# Patient Record
Sex: FEMALE | Race: WHITE | NOT HISPANIC OR LATINO | Employment: OTHER | URBAN - METROPOLITAN AREA
[De-identification: names, ages, dates, MRNs, and addresses within clinical notes are randomized per-mention and may not be internally consistent; named-entity substitution may affect disease eponyms.]

---

## 2023-06-14 ENCOUNTER — TELEPHONE (OUTPATIENT)
Dept: PRIMARY CARE | Facility: CLINIC | Age: 68
End: 2023-06-14
Payer: MEDICARE

## 2023-07-11 LAB
ALANINE AMINOTRANSFERASE (SGPT) (U/L) IN SER/PLAS: 24 U/L (ref 7–45)
ALBUMIN (G/DL) IN SER/PLAS: 4.4 G/DL (ref 3.4–5)
ALKALINE PHOSPHATASE (U/L) IN SER/PLAS: 46 U/L (ref 33–136)
ANION GAP IN SER/PLAS: 11 MMOL/L (ref 10–20)
ASPARTATE AMINOTRANSFERASE (SGOT) (U/L) IN SER/PLAS: 21 U/L (ref 9–39)
BASOPHILS (10*3/UL) IN BLOOD BY AUTOMATED COUNT: 0.04 X10E9/L (ref 0–0.1)
BASOPHILS/100 LEUKOCYTES IN BLOOD BY AUTOMATED COUNT: 0.8 % (ref 0–2)
BILIRUBIN TOTAL (MG/DL) IN SER/PLAS: 0.5 MG/DL (ref 0–1.2)
CALCIDIOL (25 OH VITAMIN D3) (NG/ML) IN SER/PLAS: 61 NG/ML
CALCIUM (MG/DL) IN SER/PLAS: 9 MG/DL (ref 8.6–10.3)
CARBON DIOXIDE, TOTAL (MMOL/L) IN SER/PLAS: 28 MMOL/L (ref 21–32)
CHLORIDE (MMOL/L) IN SER/PLAS: 104 MMOL/L (ref 98–107)
COBALAMIN (VITAMIN B12) (PG/ML) IN SER/PLAS: 961 PG/ML (ref 211–911)
CREATININE (MG/DL) IN SER/PLAS: 0.84 MG/DL (ref 0.5–1.05)
EOSINOPHILS (10*3/UL) IN BLOOD BY AUTOMATED COUNT: 0.1 X10E9/L (ref 0–0.7)
EOSINOPHILS/100 LEUKOCYTES IN BLOOD BY AUTOMATED COUNT: 2.1 % (ref 0–6)
ERYTHROCYTE DISTRIBUTION WIDTH (RATIO) BY AUTOMATED COUNT: 13.2 % (ref 11.5–14.5)
ERYTHROCYTE MEAN CORPUSCULAR HEMOGLOBIN CONCENTRATION (G/DL) BY AUTOMATED: 32.7 G/DL (ref 32–36)
ERYTHROCYTE MEAN CORPUSCULAR VOLUME (FL) BY AUTOMATED COUNT: 90 FL (ref 80–100)
ERYTHROCYTES (10*6/UL) IN BLOOD BY AUTOMATED COUNT: 4.75 X10E12/L (ref 4–5.2)
GFR FEMALE: 75 ML/MIN/1.73M2
GLUCOSE (MG/DL) IN SER/PLAS: 88 MG/DL (ref 74–99)
HEMATOCRIT (%) IN BLOOD BY AUTOMATED COUNT: 42.8 % (ref 36–46)
HEMOGLOBIN (G/DL) IN BLOOD: 14 G/DL (ref 12–16)
IMMATURE GRANULOCYTES/100 LEUKOCYTES IN BLOOD BY AUTOMATED COUNT: 0.2 % (ref 0–0.9)
LEUKOCYTES (10*3/UL) IN BLOOD BY AUTOMATED COUNT: 4.9 X10E9/L (ref 4.4–11.3)
LYMPHOCYTES (10*3/UL) IN BLOOD BY AUTOMATED COUNT: 1.75 X10E9/L (ref 1.2–4.8)
LYMPHOCYTES/100 LEUKOCYTES IN BLOOD BY AUTOMATED COUNT: 36 % (ref 13–44)
MONOCYTES (10*3/UL) IN BLOOD BY AUTOMATED COUNT: 0.41 X10E9/L (ref 0.1–1)
MONOCYTES/100 LEUKOCYTES IN BLOOD BY AUTOMATED COUNT: 8.4 % (ref 2–10)
NEUTROPHILS (10*3/UL) IN BLOOD BY AUTOMATED COUNT: 2.55 X10E9/L (ref 1.2–7.7)
NEUTROPHILS/100 LEUKOCYTES IN BLOOD BY AUTOMATED COUNT: 52.5 % (ref 40–80)
PLATELETS (10*3/UL) IN BLOOD AUTOMATED COUNT: 211 X10E9/L (ref 150–450)
POTASSIUM (MMOL/L) IN SER/PLAS: 3.7 MMOL/L (ref 3.5–5.3)
PROTEIN TOTAL: 7.1 G/DL (ref 6.4–8.2)
SODIUM (MMOL/L) IN SER/PLAS: 139 MMOL/L (ref 136–145)
THYROTROPIN (MIU/L) IN SER/PLAS BY DETECTION LIMIT <= 0.05 MIU/L: 2.3 MIU/L (ref 0.44–3.98)
UREA NITROGEN (MG/DL) IN SER/PLAS: 16 MG/DL (ref 6–23)

## 2023-07-13 LAB
CHOLESTEROL, TOTAL(NMR): 199 MG/DL (ref 100–199)
HDL-C(NMR): 68 MG/DL
HDL-P (TOTAL)(NMR): 39.9 UMOL/L
LDL AND HDL PARTICLES -DATA CONVERSION: ABNORMAL
LDL SIZE(NMR): 20.8 NM
LDL-C (NIH CALC)(NMR): 117 MG/DL (ref 0–99)
LDL-P(NMR): 1620 NMOL/L
LP-IR SCORE(NMR): 29
SMALL LDL-P(NMR): 690 NMOL/L
TRIGLYCERIDES(NMR): 76 MG/DL (ref 0–149)

## 2023-07-14 PROBLEM — K21.9 GERD (GASTROESOPHAGEAL REFLUX DISEASE): Status: ACTIVE | Noted: 2023-07-14

## 2023-07-14 PROBLEM — E78.5 HYPERLIPIDEMIA: Status: ACTIVE | Noted: 2023-07-14

## 2023-07-14 PROBLEM — E03.9 HYPOTHYROIDISM: Status: ACTIVE | Noted: 2023-07-14

## 2023-07-14 PROBLEM — K21.00 CHRONIC REFLUX ESOPHAGITIS: Status: ACTIVE | Noted: 2023-07-14

## 2023-07-14 PROBLEM — S91.201A: Status: ACTIVE | Noted: 2023-07-14

## 2023-07-14 PROBLEM — L90.8 SKIN AGING: Status: ACTIVE | Noted: 2023-07-14

## 2023-07-14 PROBLEM — M81.0 OSTEOPOROSIS: Status: ACTIVE | Noted: 2023-07-14

## 2023-07-14 RX ORDER — LEVOTHYROXINE SODIUM 50 UG/1
1 TABLET ORAL DAILY
COMMUNITY
Start: 2022-03-07 | End: 2023-07-25

## 2023-07-14 RX ORDER — OMEPRAZOLE 40 MG/1
1 CAPSULE, DELAYED RELEASE ORAL 2 TIMES DAILY
COMMUNITY
Start: 2022-02-28

## 2023-07-14 RX ORDER — DENOSUMAB 60 MG/ML
INJECTION SUBCUTANEOUS
COMMUNITY
Start: 2019-12-03 | End: 2023-07-25 | Stop reason: SDUPTHER

## 2023-07-14 RX ORDER — PRAVASTATIN SODIUM 40 MG/1
40 TABLET ORAL NIGHTLY
COMMUNITY
Start: 2022-08-17 | End: 2023-07-25 | Stop reason: ALTCHOICE

## 2023-07-14 RX ORDER — ESTRADIOL 0.1 MG/G
CREAM VAGINAL
COMMUNITY
Start: 2023-02-08

## 2023-07-14 RX ORDER — VITAMIN B COMPLEX
TABLET ORAL
COMMUNITY
Start: 2021-12-07

## 2023-07-14 RX ORDER — SIMETHICONE 40MG/0.6ML
1 SUSPENSION, DROPS(FINAL DOSAGE FORM)(ML) ORAL DAILY
COMMUNITY
Start: 2021-12-07

## 2023-07-14 RX ORDER — LEVOTHYROXINE SODIUM 25 UG/1
25 TABLET ORAL DAILY
COMMUNITY
Start: 2023-05-27 | End: 2024-02-12

## 2023-07-14 RX ORDER — EZETIMIBE 10 MG/1
TABLET ORAL
COMMUNITY
End: 2023-07-25 | Stop reason: SDUPTHER

## 2023-07-25 ENCOUNTER — OFFICE VISIT (OUTPATIENT)
Dept: PRIMARY CARE | Facility: CLINIC | Age: 68
End: 2023-07-25
Payer: MEDICARE

## 2023-07-25 VITALS
BODY MASS INDEX: 23.56 KG/M2 | OXYGEN SATURATION: 95 % | SYSTOLIC BLOOD PRESSURE: 115 MMHG | WEIGHT: 120 LBS | HEART RATE: 75 BPM | DIASTOLIC BLOOD PRESSURE: 74 MMHG | HEIGHT: 60 IN | TEMPERATURE: 96.5 F

## 2023-07-25 DIAGNOSIS — G47.30 SLEEP-DISORDERED BREATHING: ICD-10-CM

## 2023-07-25 DIAGNOSIS — E78.2 MIXED HYPERLIPIDEMIA: ICD-10-CM

## 2023-07-25 DIAGNOSIS — M81.0 OSTEOPOROSIS, UNSPECIFIED OSTEOPOROSIS TYPE, UNSPECIFIED PATHOLOGICAL FRACTURE PRESENCE: ICD-10-CM

## 2023-07-25 DIAGNOSIS — K21.9 GASTROESOPHAGEAL REFLUX DISEASE WITHOUT ESOPHAGITIS: Primary | ICD-10-CM

## 2023-07-25 DIAGNOSIS — Z00.00 HEALTHCARE MAINTENANCE: ICD-10-CM

## 2023-07-25 DIAGNOSIS — E03.9 ACQUIRED HYPOTHYROIDISM: ICD-10-CM

## 2023-07-25 DIAGNOSIS — R06.83 SNORING: ICD-10-CM

## 2023-07-25 PROCEDURE — 1159F MED LIST DOCD IN RCRD: CPT | Performed by: INTERNAL MEDICINE

## 2023-07-25 PROCEDURE — 1125F AMNT PAIN NOTED PAIN PRSNT: CPT | Performed by: INTERNAL MEDICINE

## 2023-07-25 PROCEDURE — 1036F TOBACCO NON-USER: CPT | Performed by: INTERNAL MEDICINE

## 2023-07-25 PROCEDURE — 99214 OFFICE O/P EST MOD 30 MIN: CPT | Performed by: INTERNAL MEDICINE

## 2023-07-25 RX ORDER — DENOSUMAB 60 MG/ML
60 INJECTION SUBCUTANEOUS ONCE
Qty: 1 ML | Refills: 1 | Status: SHIPPED | OUTPATIENT
Start: 2023-07-25 | End: 2023-07-25

## 2023-07-25 RX ORDER — EZETIMIBE 10 MG/1
10 TABLET ORAL DAILY
Qty: 90 TABLET | Refills: 3 | Status: SHIPPED | OUTPATIENT
Start: 2023-07-25 | End: 2024-07-24

## 2023-07-25 RX ORDER — ROSUVASTATIN CALCIUM 5 MG/1
5 TABLET, COATED ORAL DAILY
Qty: 90 TABLET | Refills: 3 | Status: SHIPPED | OUTPATIENT
Start: 2023-07-25 | End: 2024-07-24

## 2023-07-25 ASSESSMENT — PAIN SCALES - GENERAL: PAINLEVEL: 4

## 2023-07-25 ASSESSMENT — ENCOUNTER SYMPTOMS
LOSS OF SENSATION IN FEET: 0
OCCASIONAL FEELINGS OF UNSTEADINESS: 0
DEPRESSION: 0

## 2023-07-25 ASSESSMENT — PATIENT HEALTH QUESTIONNAIRE - PHQ9
1. LITTLE INTEREST OR PLEASURE IN DOING THINGS: NOT AT ALL
2. FEELING DOWN, DEPRESSED OR HOPELESS: NOT AT ALL
SUM OF ALL RESPONSES TO PHQ9 QUESTIONS 1 & 2: 0

## 2023-07-25 NOTE — PROGRESS NOTES
Subjective   Patient ID: Kimberli Lu is a 68 y.o. female who presents for Follow-up (Would like a referral to sleep study/Referral to foot doctor).    HPI    Patient presents for a follow up visit. She was last seen in February 2023 for her Medicare Annual Wellness visit. Most recent lab work done 7/11/23.     Osteoporosis: patient takes Prolia, takes calcium and vitamin D. She gets Prolia injection at  [facility] now. Needs a new order, would prefer printed script.     Right big toe: She complains of a loose toenail on her right big toe, related to her ski boots. This has been bothersome for about 8 months. She reports that it is getting better as her toenail grows out, but is concerned about the length of time it has been bothersome.     Sleep issues: The patient snores, and she is concerned that she may have sleep apnea. She is wondering if a sleep study should be done. She does sleep ok, though it has not been as good recently due to other issues in her life. Patient feels OK in the morning as long as she does not wake up at night.       left knee pain: noted some increased pain on the medical aspect of her left knee for several months, worse after exercise, no injury, no skin changes/redness/swelling. Was advised RICE treatment at last visit, with consideration for ortho referral if pain is not improved. She reports that this is still bothersome.      hypothyroidism: patient takes low dose levothyroxine 25 mcg, needs refill. TSH 2.3.     acid reflux: patient previously took omeprazole, but now is only using it as needed. She usually only uses this medication when she eats acidic foods. She does not have any symptom if she stays away from those foods.      hyperlipidemia: taking ezetimibe, most recent lipid panel showed TG 76, cholesterol 199. Patient used to take pravastatin but does not take it now.  She had NMR lipid profile completed.     B 12 deficiency: Concerned her B12 is too high based on recent lab  work. Taking supplement every other day usually, though stopped after she saw her lab work. B12 remains elevated (961).     Colonoscopy: 2/28/22, 5 years  Mammogram: done in early 2023, normal results  Pap smear: Sees gynecologist   TARAH: pending, scheduled for January 2024  Vaccines due: Shingrix 10/16/2020, second dose 3/20/2021. Up to date on pneumonia shot.  Patient would like to get TDAP if able.     Review of Systems  In addition to that documented in the HPI above, the additional ROS was obtained:  Constitutional: Denies fevers or chills  Eyes: Denies vision changes  ENMT: Denies trouble swallowing  Cardiovascular: Denies chest pain or heart racing  Respiratory: Denies SOB or cough  Gastrointestinal: Denies nausea, vomiting, diarrhea or constipation  Musculoskeletal: Denies joint pain or joint swelling, other than left knee  Neuro: denies frequent headaches or dizziness  Psych: denies depression or anxiety      Objective     Visit Vitals  /74 (BP Location: Right arm, Patient Position: Sitting, BP Cuff Size: Adult)   Pulse 75   Temp 35.8 °C (96.5 °F) (Temporal)   Ht 1.524 m (5')   Wt 54.4 kg (120 lb)   SpO2 95%   BMI 23.44 kg/m²   Smoking Status Never   BSA 1.52 m²      Physical Exam  CONSTITUTIONAL - well nourished, well developed, looks like stated age, in no acute distress, not ill-appearing, and not tired appearing  SKIN - normal skin color and pigmentation, normal skin turgor without rash, lesions, or nodules visualized, bruising of right great toe, separation of the medial right great toenail at the distal tip is noted  HEAD - no trauma, normocephalic  EYES - extraocular muscles are intact, and normal external exam  NECK - supple without rigidity, no neck mass was observed, no thyromegaly or thyroid nodules  CHEST - clear to auscultation, no wheezing, no crackles and no rales, good effort  CARDIAC - regular rate and regular rhythm, no skipped beats, no murmur  EXTREMITIES - no edema, no  deformities  NEUROLOGICAL - alert, oriented and no focal signs  MSK - left knee without erythema, ecchymosis, or swelling; tender to palpation on medical aspect of joint; negative valgus/varus stress test  PSYCHIATRIC - alert, pleasant and cordial, age-appropriate  IMMUNOLOGIC - no cervical lymphadenopathy    Health Maintenance Due   Topic Date Due    TSH Level  Never done    Bone Density Scan  Never done    Hepatitis C Screening  Never done    Mammogram  Never done    Hepatitis B Vaccines (4 of 4 - 4-dose series) 09/09/2011    COVID-19 Vaccine (4 - Booster for Moderna series) 12/29/2022        Assessment/Plan   Problem List Items Addressed This Visit       Osteoporosis     Continue with Prolia injections every 6 months. New script will be given to the patient today. She is scheduled for updated DEXA scan in January.          Relevant Medications    denosumab (Prolia) 60 mg/mL syringe    Other Relevant Orders    Albumin , Urine Random    Vitamin D 1,25 Dihydroxy    Comprehensive Metabolic Panel    CBC and Auto Differential    Hypothyroidism     Stable, continue with levothyroxine 25 mcg. Most recent TSH was within normal limits. Recheck labs in 6 months         Relevant Orders    TSH with reflex to Free T4 if abnormal    Albumin , Urine Random    Comprehensive Metabolic Panel    CBC and Auto Differential    Hyperlipidemia     Reviewed labs, will add low dose rosuvastatin (5 mg daily) to Zetia,  monitor for side effects, recheck labs in 4-6 months. Will order NMR lipid profile in 6 months         Relevant Medications    ezetimibe (Zetia) 10 mg tablet    rosuvastatin (Crestor) 5 mg tablet    Other Relevant Orders    Albumin , Urine Random    Comprehensive Metabolic Panel    CBC and Auto Differential    Lipoprotein NMR    GERD (gastroesophageal reflux disease) - Primary     Stable, manages symptoms with occasional omeprazole.          Relevant Orders    Albumin , Urine Random    Comprehensive Metabolic Panel    CBC and  Auto Differential    Sleep-disordered breathing     Check a sleep study, patient actually requests an evaluation         Relevant Orders    In-Center Sleep Study (Non-Sleep Provider Only)    Albumin , Urine Random    Comprehensive Metabolic Panel    CBC and Auto Differential     Other Visit Diagnoses       Snoring        Relevant Orders    In-Center Sleep Study (Non-Sleep Provider Only)    Albumin , Urine Random    Comprehensive Metabolic Panel    CBC and Auto Differential          Recheck labs in 6 months.   Follow up in 6 months for Medicare Annual Wellness visit.

## 2023-11-08 NOTE — ASSESSMENT & PLAN NOTE
Check a sleep study, patient actually requests an evaluation  
Continue with Prolia injections every 6 months. New script will be given to the patient today. She is scheduled for updated DEXA scan in January.   
Reviewed labs, will add low dose rosuvastatin (5 mg daily) to Zetia,  monitor for side effects, recheck labs in 4-6 months. Will order NMR lipid profile in 6 months  
Stable, continue with levothyroxine 25 mcg. Most recent TSH was within normal limits. Recheck labs in 6 months  
Stable, manages symptoms with occasional omeprazole.   
Plan: KOH negative for fungus. Confirmed by Dr. Contreras
Initiate Treatment: ketoconazole 2 % shampoo Sig: Wash scalp once per week, leave on for 5 minutes then wash off prn flare.\\n\\nfluocinolone 0.01 % topical solution Sig: Apply to AA on scalp BID x 2 weeks prn flare
Detail Level: Zone

## 2024-01-03 ENCOUNTER — TELEPHONE (OUTPATIENT)
Dept: PRIMARY CARE | Facility: CLINIC | Age: 69
End: 2024-01-03
Payer: MEDICARE

## 2024-01-03 DIAGNOSIS — M81.0 OSTEOPOROSIS, UNSPECIFIED OSTEOPOROSIS TYPE, UNSPECIFIED PATHOLOGICAL FRACTURE PRESENCE: ICD-10-CM

## 2024-01-03 DIAGNOSIS — M81.0 AGE-RELATED OSTEOPOROSIS WITHOUT CURRENT PATHOLOGICAL FRACTURE: Primary | ICD-10-CM

## 2024-01-03 NOTE — TELEPHONE ENCOUNTER
Pt needs denosumab (Prolia) 60 mg/mL syringe sent to infusion center. She was also told she needs you to order a lab for Ionized Calcium please.

## 2024-01-09 RX ORDER — FAMOTIDINE 10 MG/ML
20 INJECTION INTRAVENOUS ONCE AS NEEDED
Status: CANCELLED | OUTPATIENT
Start: 2024-01-09

## 2024-01-09 RX ORDER — DIPHENHYDRAMINE HYDROCHLORIDE 50 MG/ML
50 INJECTION INTRAMUSCULAR; INTRAVENOUS AS NEEDED
Status: CANCELLED | OUTPATIENT
Start: 2024-01-09

## 2024-01-09 RX ORDER — ACETAMINOPHEN 325 MG/1
650 TABLET ORAL ONCE
Status: CANCELLED | OUTPATIENT
Start: 2024-01-09

## 2024-01-09 RX ORDER — EPINEPHRINE 0.3 MG/.3ML
0.3 INJECTION SUBCUTANEOUS EVERY 5 MIN PRN
Status: CANCELLED | OUTPATIENT
Start: 2024-01-09

## 2024-01-09 RX ORDER — ALBUTEROL SULFATE 0.83 MG/ML
3 SOLUTION RESPIRATORY (INHALATION) AS NEEDED
Status: CANCELLED | OUTPATIENT
Start: 2024-01-09

## 2024-01-09 NOTE — TELEPHONE ENCOUNTER
Pt called back in and stated the infusion center did not receive the order. Pt was told she needs an order placed for ionized calcium, as well. Please advise.

## 2024-01-11 ENCOUNTER — LAB (OUTPATIENT)
Dept: LAB | Facility: LAB | Age: 69
End: 2024-01-11
Payer: MEDICARE

## 2024-01-11 DIAGNOSIS — R06.83 SNORING: ICD-10-CM

## 2024-01-11 DIAGNOSIS — E78.2 MIXED HYPERLIPIDEMIA: ICD-10-CM

## 2024-01-11 DIAGNOSIS — M81.0 OSTEOPOROSIS, UNSPECIFIED OSTEOPOROSIS TYPE, UNSPECIFIED PATHOLOGICAL FRACTURE PRESENCE: ICD-10-CM

## 2024-01-11 DIAGNOSIS — K21.9 GASTROESOPHAGEAL REFLUX DISEASE WITHOUT ESOPHAGITIS: ICD-10-CM

## 2024-01-11 DIAGNOSIS — G47.30 SLEEP-DISORDERED BREATHING: ICD-10-CM

## 2024-01-11 DIAGNOSIS — E03.9 ACQUIRED HYPOTHYROIDISM: ICD-10-CM

## 2024-01-11 LAB
ALBUMIN SERPL BCP-MCNC: 4.3 G/DL (ref 3.4–5)
ALP SERPL-CCNC: 47 U/L (ref 33–136)
ALT SERPL W P-5'-P-CCNC: 14 U/L (ref 7–45)
ANION GAP SERPL CALC-SCNC: 9 MMOL/L (ref 10–20)
AST SERPL W P-5'-P-CCNC: 16 U/L (ref 9–39)
BASOPHILS # BLD AUTO: 0.04 X10*3/UL (ref 0–0.1)
BASOPHILS NFR BLD AUTO: 0.7 %
BILIRUB SERPL-MCNC: 0.5 MG/DL (ref 0–1.2)
BUN SERPL-MCNC: 15 MG/DL (ref 6–23)
CA-I BLD-SCNC: 1.21 MMOL/L (ref 1.1–1.33)
CALCIUM SERPL-MCNC: 9.2 MG/DL (ref 8.6–10.3)
CHLORIDE SERPL-SCNC: 104 MMOL/L (ref 98–107)
CO2 SERPL-SCNC: 28 MMOL/L (ref 21–32)
CREAT SERPL-MCNC: 0.7 MG/DL (ref 0.5–1.05)
CREAT UR-MCNC: 104.5 MG/DL (ref 20–320)
EGFRCR SERPLBLD CKD-EPI 2021: >90 ML/MIN/1.73M*2
EOSINOPHIL # BLD AUTO: 0.08 X10*3/UL (ref 0–0.7)
EOSINOPHIL NFR BLD AUTO: 1.4 %
ERYTHROCYTE [DISTWIDTH] IN BLOOD BY AUTOMATED COUNT: 13.1 % (ref 11.5–14.5)
GLUCOSE SERPL-MCNC: 81 MG/DL (ref 74–99)
HCT VFR BLD AUTO: 43.6 % (ref 36–46)
HGB BLD-MCNC: 14.2 G/DL (ref 12–16)
IMM GRANULOCYTES # BLD AUTO: 0.01 X10*3/UL (ref 0–0.7)
IMM GRANULOCYTES NFR BLD AUTO: 0.2 % (ref 0–0.9)
LYMPHOCYTES # BLD AUTO: 1.67 X10*3/UL (ref 1.2–4.8)
LYMPHOCYTES NFR BLD AUTO: 28.2 %
MCH RBC QN AUTO: 29.9 PG (ref 26–34)
MCHC RBC AUTO-ENTMCNC: 32.6 G/DL (ref 32–36)
MCV RBC AUTO: 92 FL (ref 80–100)
MICROALBUMIN UR-MCNC: 8.9 MG/L
MICROALBUMIN/CREAT UR: 8.5 UG/MG CREAT
MONOCYTES # BLD AUTO: 0.34 X10*3/UL (ref 0.1–1)
MONOCYTES NFR BLD AUTO: 5.7 %
NEUTROPHILS # BLD AUTO: 3.78 X10*3/UL (ref 1.2–7.7)
NEUTROPHILS NFR BLD AUTO: 63.8 %
NRBC BLD-RTO: 0 /100 WBCS (ref 0–0)
PLATELET # BLD AUTO: 223 X10*3/UL (ref 150–450)
POTASSIUM SERPL-SCNC: 3.8 MMOL/L (ref 3.5–5.3)
PROT SERPL-MCNC: 6.5 G/DL (ref 6.4–8.2)
RBC # BLD AUTO: 4.75 X10*6/UL (ref 4–5.2)
SODIUM SERPL-SCNC: 137 MMOL/L (ref 136–145)
TSH SERPL-ACNC: 1.74 MIU/L (ref 0.44–3.98)
WBC # BLD AUTO: 5.9 X10*3/UL (ref 4.4–11.3)

## 2024-01-11 PROCEDURE — 84443 ASSAY THYROID STIM HORMONE: CPT

## 2024-01-11 PROCEDURE — 82570 ASSAY OF URINE CREATININE: CPT

## 2024-01-11 PROCEDURE — 36415 COLL VENOUS BLD VENIPUNCTURE: CPT

## 2024-01-11 PROCEDURE — 83704 LIPOPROTEIN BLD QUAN PART: CPT

## 2024-01-11 PROCEDURE — 82330 ASSAY OF CALCIUM: CPT

## 2024-01-11 PROCEDURE — 82652 VIT D 1 25-DIHYDROXY: CPT

## 2024-01-11 PROCEDURE — 80053 COMPREHEN METABOLIC PANEL: CPT

## 2024-01-11 PROCEDURE — 85025 COMPLETE CBC W/AUTO DIFF WBC: CPT

## 2024-01-11 PROCEDURE — 82043 UR ALBUMIN QUANTITATIVE: CPT

## 2024-01-13 LAB — 1,25(OH)2D3 SERPL-MCNC: 67.2 PG/ML (ref 19.9–79.3)

## 2024-01-15 ENCOUNTER — INFUSION (OUTPATIENT)
Dept: INFUSION THERAPY | Facility: HOSPITAL | Age: 69
End: 2024-01-15
Payer: MEDICARE

## 2024-01-15 VITALS
SYSTOLIC BLOOD PRESSURE: 108 MMHG | DIASTOLIC BLOOD PRESSURE: 71 MMHG | HEART RATE: 70 BPM | OXYGEN SATURATION: 98 % | RESPIRATION RATE: 18 BRPM

## 2024-01-15 DIAGNOSIS — M81.0 OSTEOPOROSIS, UNSPECIFIED OSTEOPOROSIS TYPE, UNSPECIFIED PATHOLOGICAL FRACTURE PRESENCE: ICD-10-CM

## 2024-01-15 DIAGNOSIS — M81.0 AGE-RELATED OSTEOPOROSIS WITHOUT CURRENT PATHOLOGICAL FRACTURE: ICD-10-CM

## 2024-01-15 LAB
CHOLEST SERPL-MCNC: 132 MG/DL (ref 100–199)
HDL SERPL-SCNC: 36.4 UMOL/L
HDLC SERPL-MCNC: 62 MG/DL
LDL SERPL QN: 20.2 NM
LDL SERPL-SCNC: 551 NMOL/L
LDL SMALL SERPL-SCNC: 361 NMOL/L
LDLC SERPL CALC-MCNC: 53 MG/DL (ref 0–99)
LP-IR SCORE SERPL: 29
TRIGL SERPL-MCNC: 89 MG/DL (ref 0–149)

## 2024-01-15 PROCEDURE — 96372 THER/PROPH/DIAG INJ SC/IM: CPT | Performed by: INTERNAL MEDICINE

## 2024-01-15 PROCEDURE — 2500000004 HC RX 250 GENERAL PHARMACY W/ HCPCS (ALT 636 FOR OP/ED): Mod: JZ | Performed by: INTERNAL MEDICINE

## 2024-01-15 RX ORDER — DIPHENHYDRAMINE HYDROCHLORIDE 50 MG/ML
50 INJECTION INTRAMUSCULAR; INTRAVENOUS AS NEEDED
OUTPATIENT
Start: 2024-07-09

## 2024-01-15 RX ORDER — ACETAMINOPHEN 325 MG/1
650 TABLET ORAL ONCE
Status: DISCONTINUED | OUTPATIENT
Start: 2024-01-15 | End: 2024-01-15 | Stop reason: HOSPADM

## 2024-01-15 RX ORDER — FAMOTIDINE 10 MG/ML
20 INJECTION INTRAVENOUS ONCE AS NEEDED
OUTPATIENT
Start: 2024-07-09

## 2024-01-15 RX ORDER — ACETAMINOPHEN 325 MG/1
650 TABLET ORAL ONCE
OUTPATIENT
Start: 2024-07-09

## 2024-01-15 RX ORDER — ALBUTEROL SULFATE 0.83 MG/ML
3 SOLUTION RESPIRATORY (INHALATION) AS NEEDED
OUTPATIENT
Start: 2024-07-09

## 2024-01-15 RX ORDER — EPINEPHRINE 0.3 MG/.3ML
0.3 INJECTION SUBCUTANEOUS EVERY 5 MIN PRN
OUTPATIENT
Start: 2024-07-09

## 2024-01-15 RX ADMIN — DENOSUMAB 60 MG: 60 INJECTION SUBCUTANEOUS at 14:10

## 2024-01-15 SDOH — ECONOMIC STABILITY: FOOD INSECURITY: WITHIN THE PAST 12 MONTHS, YOU WORRIED THAT YOUR FOOD WOULD RUN OUT BEFORE YOU GOT MONEY TO BUY MORE.: NEVER TRUE

## 2024-01-15 SDOH — ECONOMIC STABILITY: FOOD INSECURITY: WITHIN THE PAST 12 MONTHS, THE FOOD YOU BOUGHT JUST DIDN'T LAST AND YOU DIDN'T HAVE MONEY TO GET MORE.: NEVER TRUE

## 2024-01-15 ASSESSMENT — PATIENT HEALTH QUESTIONNAIRE - PHQ9
1. LITTLE INTEREST OR PLEASURE IN DOING THINGS: NOT AT ALL
SUM OF ALL RESPONSES TO PHQ9 QUESTIONS 1 AND 2: 0
2. FEELING DOWN, DEPRESSED OR HOPELESS: NOT AT ALL

## 2024-01-15 ASSESSMENT — ENCOUNTER SYMPTOMS
DEPRESSION: 0
OCCASIONAL FEELINGS OF UNSTEADINESS: 0
LOSS OF SENSATION IN FEET: 0

## 2024-01-15 ASSESSMENT — COLUMBIA-SUICIDE SEVERITY RATING SCALE - C-SSRS
6. HAVE YOU EVER DONE ANYTHING, STARTED TO DO ANYTHING, OR PREPARED TO DO ANYTHING TO END YOUR LIFE?: NO
2. HAVE YOU ACTUALLY HAD ANY THOUGHTS OF KILLING YOURSELF?: NO
1. IN THE PAST MONTH, HAVE YOU WISHED YOU WERE DEAD OR WISHED YOU COULD GO TO SLEEP AND NOT WAKE UP?: NO

## 2024-01-17 ENCOUNTER — APPOINTMENT (OUTPATIENT)
Dept: INFUSION THERAPY | Facility: HOSPITAL | Age: 69
End: 2024-01-17
Payer: MEDICARE

## 2024-01-19 ENCOUNTER — OFFICE VISIT (OUTPATIENT)
Dept: PRIMARY CARE | Facility: CLINIC | Age: 69
End: 2024-01-19
Payer: MEDICARE

## 2024-01-19 ENCOUNTER — HOSPITAL ENCOUNTER (OUTPATIENT)
Dept: RADIOLOGY | Facility: CLINIC | Age: 69
Discharge: HOME | End: 2024-01-19
Payer: MEDICARE

## 2024-01-19 VITALS
BODY MASS INDEX: 23.36 KG/M2 | HEART RATE: 60 BPM | TEMPERATURE: 97.3 F | SYSTOLIC BLOOD PRESSURE: 112 MMHG | RESPIRATION RATE: 16 BRPM | HEIGHT: 60 IN | OXYGEN SATURATION: 95 % | DIASTOLIC BLOOD PRESSURE: 70 MMHG | WEIGHT: 119 LBS

## 2024-01-19 DIAGNOSIS — M25.562 LEFT MEDIAL KNEE PAIN: ICD-10-CM

## 2024-01-19 DIAGNOSIS — R74.8 ELEVATED VITAMIN B12 LEVEL: ICD-10-CM

## 2024-01-19 DIAGNOSIS — K21.9 GASTROESOPHAGEAL REFLUX DISEASE WITHOUT ESOPHAGITIS: ICD-10-CM

## 2024-01-19 DIAGNOSIS — M81.0 AGE-RELATED OSTEOPOROSIS WITHOUT CURRENT PATHOLOGICAL FRACTURE: ICD-10-CM

## 2024-01-19 DIAGNOSIS — E03.9 ACQUIRED HYPOTHYROIDISM: ICD-10-CM

## 2024-01-19 DIAGNOSIS — E78.2 MIXED HYPERLIPIDEMIA: Primary | ICD-10-CM

## 2024-01-19 PROCEDURE — 99214 OFFICE O/P EST MOD 30 MIN: CPT | Performed by: INTERNAL MEDICINE

## 2024-01-19 PROCEDURE — 1125F AMNT PAIN NOTED PAIN PRSNT: CPT | Performed by: INTERNAL MEDICINE

## 2024-01-19 PROCEDURE — 73564 X-RAY EXAM KNEE 4 OR MORE: CPT | Mod: LEFT SIDE | Performed by: RADIOLOGY

## 2024-01-19 PROCEDURE — 1159F MED LIST DOCD IN RCRD: CPT | Performed by: INTERNAL MEDICINE

## 2024-01-19 PROCEDURE — 1036F TOBACCO NON-USER: CPT | Performed by: INTERNAL MEDICINE

## 2024-01-19 PROCEDURE — 73564 X-RAY EXAM KNEE 4 OR MORE: CPT | Mod: LT

## 2024-01-19 SDOH — ECONOMIC STABILITY: FOOD INSECURITY: WITHIN THE PAST 12 MONTHS, YOU WORRIED THAT YOUR FOOD WOULD RUN OUT BEFORE YOU GOT MONEY TO BUY MORE.: NEVER TRUE

## 2024-01-19 SDOH — ECONOMIC STABILITY: FOOD INSECURITY: WITHIN THE PAST 12 MONTHS, THE FOOD YOU BOUGHT JUST DIDN'T LAST AND YOU DIDN'T HAVE MONEY TO GET MORE.: NEVER TRUE

## 2024-01-19 ASSESSMENT — PATIENT HEALTH QUESTIONNAIRE - PHQ9
SUM OF ALL RESPONSES TO PHQ9 QUESTIONS 1 & 2: 0
1. LITTLE INTEREST OR PLEASURE IN DOING THINGS: NOT AT ALL
2. FEELING DOWN, DEPRESSED OR HOPELESS: NOT AT ALL

## 2024-01-19 ASSESSMENT — LIFESTYLE VARIABLES
AUDIT-C TOTAL SCORE: 1
HOW OFTEN DO YOU HAVE SIX OR MORE DRINKS ON ONE OCCASION: NEVER
HOW MANY STANDARD DRINKS CONTAINING ALCOHOL DO YOU HAVE ON A TYPICAL DAY: 1 OR 2
SKIP TO QUESTIONS 9-10: 1
HOW OFTEN DO YOU HAVE A DRINK CONTAINING ALCOHOL: MONTHLY OR LESS

## 2024-01-19 NOTE — PROGRESS NOTES
Chief Complaint/HPI:    Follow up     Osteoporosis: patient takes Prolia, takes calcium and vitamin D. She gets Prolia injection at  [facility] now. Patient had DEXA scan completed at [facility] on 10/30/2023       Sleep issues: The patient snores, and she is concerned that she may have sleep apnea. Patient did not have a sleep study completed.     left knee pain: noted some increased pain on the medical aspect of her left knee for several months, worse after exercise, no injury, no skin changes/redness/swelling. Patient states that ache is constant ache over the medial aspect of the knee, patient wonders if an MRI would be helpful. Patient has not had an x ray of the knee completed.      hypothyroidism: patient takes low dose levothyroxine 25 mcg.     acid reflux: patient previously took omeprazole, but now is only using it as needed. She usually only uses this medication when she eats acidic foods. She does not have any symptom if she stays away from those foods.      hyperlipidemia: taking Zetia and rosuvastatin now. Labs were recently complted     B 12 deficiency: Concerned her B12 is too high based on recent lab work.    ROS otherwise negative aside from what was mentioned above in HPI.      Patient Active Problem List   Diagnosis    Traumatic loss of toenail of right great toe    Skin aging    Age-related osteoporosis without current pathological fracture    Hypothyroidism    Hyperlipidemia    GERD (gastroesophageal reflux disease)    Chronic reflux esophagitis    Sleep-disordered breathing    Left medial knee pain         Past Medical History:   Diagnosis Date    Osteoporosis      History reviewed. No pertinent surgical history.  Social History     Social History Narrative    Not on file         ALLERGIES  Pravastatin      MEDICATIONS  Current Outpatient Medications on File Prior to Visit   Medication Sig Dispense Refill    cyanocobalamin, vitamin B-12, 2,500 mcg tablet, sublingual Place under the tongue once daily.       estradiol (Estrace) 0.01 % (0.1 mg/gram) vaginal cream APPLY 1 GRAM VAGINALLY TWICE WEEKLY      ezetimibe (Zetia) 10 mg tablet Take 1 tablet (10 mg) by mouth once daily. 90 tablet 3    levothyroxine (Synthroid, Levoxyl) 25 mcg tablet Take 1 tablet (25 mcg) by mouth once daily.      multivit-min-iron-FA-lutein (Multivitamin Women 50 Plus) 8 mg iron-400 mcg-300 mcg tablet Take 1 tablet by mouth once daily.      rosuvastatin (Crestor) 5 mg tablet Take 1 tablet (5 mg) by mouth once daily. 90 tablet 3    denosumab (Prolia) 60 mg/mL syringe Inject 1 mL (60 mg) under the skin 1 time for 1 dose. 1 mL 1    omeprazole (PriLOSEC) 40 mg DR capsule Take 1 capsule (40 mg) by mouth 2 times a day. Only as needed       No current facility-administered medications on file prior to visit.         PHYSICAL EXAM  /70 (BP Location: Left arm, Patient Position: Sitting, BP Cuff Size: Adult)   Pulse 60   Temp 36.3 °C (97.3 °F) (Temporal)   Resp 16   Ht 1.524 m (5')   Wt 54 kg (119 lb)   SpO2 95%   BMI 23.24 kg/m²   Body mass index is 23.24 kg/m².  CONSTITUTIONAL - well nourished, well developed, looks like stated age, in no acute distress, not ill-appearing, and not tired appearing  SKIN - normal skin color and pigmentation, normal skin turgor without rash, lesions, or nodules visualized on exposed skin  HEAD - no trauma, normocephalic  EYES - extraocular muscles are intact, and normal external exam  NECK - supple without rigidity, no neck mass was observed, no thyromegaly or thyroid nodules  CHEST - clear to auscultation, no wheezing, no crackles and no rales, good effort  CARDIAC - regular rate and regular rhythm, no skipped beats, no murmur, no carotid bruits noted  EXTREMITIES - no edema, no deformities  NEUROLOGICAL - alert, oriented and no focal signs  MSK - left knee without erythema, ecchymosis, or swelling; tender to palpation on medical aspect of joint, inferior to the patella; negative valgus/varus stress  test  PSYCHIATRIC - alert, pleasant and cordial, age-appropriate  IMMUNOLOGIC - no cervical lymphadenopathy    ASSESSMENT/PLAN  Problem List Items Addressed This Visit       Age-related osteoporosis without current pathological fracture    Current Assessment & Plan     Continue current med therapy including Prolia, no changes for now         Relevant Orders    Comprehensive Metabolic Panel    Vitamin D 25-Hydroxy,Total (for eval of Vitamin D levels)    CBC and Auto Differential    Hypothyroidism    Current Assessment & Plan     Labs appear stable, no med changes         Relevant Orders    Comprehensive Metabolic Panel    TSH with reflex to Free T4 if abnormal    CBC and Auto Differential    Hyperlipidemia - Primary    Current Assessment & Plan     Lipids are well controlled now, continue rosuvastatin and Zetia, recheck labs in 6 months         Relevant Orders    Comprehensive Metabolic Panel    Lipid Panel    CBC and Auto Differential    GERD (gastroesophageal reflux disease)    Relevant Orders    Comprehensive Metabolic Panel    CBC and Auto Differential    Left medial knee pain    Current Assessment & Plan     Patient has had pain for several months, patient relates the discomfort to exercise.  Check an x ray of the left knee, refer to sports medicine for an assessment of the left knee, will defer MRI for now         Relevant Orders    XR knee left 4+ views    Referral to Sports Medicine    Albumin , Urine Random    Comprehensive Metabolic Panel    CBC and Auto Differential     Other Visit Diagnoses       Elevated vitamin B12 level        Relevant Orders    Vitamin B12          Check labs in about 5 months, follow up with sports medicine as ordered, get knee x ray completed at any time    May follow up here in 6 months, continue Prolia    Buddy Godoy MD

## 2024-01-19 NOTE — ASSESSMENT & PLAN NOTE
Patient has had pain for several months, patient relates the discomfort to exercise.  Check an x ray of the left knee, refer to sports medicine for an assessment of the left knee, will defer MRI for now

## 2024-01-26 ENCOUNTER — OFFICE VISIT (OUTPATIENT)
Dept: ORTHOPEDIC SURGERY | Facility: CLINIC | Age: 69
End: 2024-01-26
Payer: MEDICARE

## 2024-01-26 VITALS — WEIGHT: 119 LBS | BODY MASS INDEX: 23.36 KG/M2 | HEIGHT: 60 IN

## 2024-01-26 DIAGNOSIS — M17.12 LOCALIZED OSTEOARTHRITIS OF LEFT KNEE: Primary | ICD-10-CM

## 2024-01-26 DIAGNOSIS — M70.50 PES ANSERINE BURSITIS: ICD-10-CM

## 2024-01-26 DIAGNOSIS — M25.562 LEFT MEDIAL KNEE PAIN: ICD-10-CM

## 2024-01-26 PROCEDURE — 99205 OFFICE O/P NEW HI 60 MIN: CPT | Performed by: EMERGENCY MEDICINE

## 2024-01-26 PROCEDURE — 1159F MED LIST DOCD IN RCRD: CPT | Performed by: EMERGENCY MEDICINE

## 2024-01-26 PROCEDURE — 1036F TOBACCO NON-USER: CPT | Performed by: EMERGENCY MEDICINE

## 2024-01-26 PROCEDURE — 1125F AMNT PAIN NOTED PAIN PRSNT: CPT | Performed by: EMERGENCY MEDICINE

## 2024-01-26 PROCEDURE — 1123F ACP DISCUSS/DSCN MKR DOCD: CPT | Performed by: EMERGENCY MEDICINE

## 2024-01-26 PROCEDURE — 1160F RVW MEDS BY RX/DR IN RCRD: CPT | Performed by: EMERGENCY MEDICINE

## 2024-01-26 NOTE — PROGRESS NOTES
Subjective    Patient ID: Kimberli Lu is a 68 y.o. female.    Chief Complaint: Pain of the Left Knee (Patient states she's had need pain for about a year. She believes it is from exercising or from skiing. /Xr 1/19/24)     Last Surgery: No surgery found  Last Surgery Date: No surgery found    Kimberli is a very pleasant 68-year-old female who is coming in with acute on chronic left knee discomfort referred here by her PCP Dr. Godoy.  She states that she is pretty active and enjoys skiing with her family.  For the last year she has noticed some gradual onset atraumatic medial left knee discomfort.  She has tried water therapy but is still having some pain.  She takes Tylenol occasionally with minimal relief of her symptoms.  She had x-rays on 1/19/2024 that were ordered by her PCP.  Here today she is reporting that her pain is mostly 3 out of 10 and is located over the medial joint line and actually over the Pes anserine bursa.  She has no clicking or decreased range of motion of the knee.  She is able to bear weight.  She would like to avoid surgery if at all possible.  She is mentioning that her son thinks that she should get an MRI.        Objective   Right Knee Exam   Right knee exam is normal.      Left Knee Exam     Muscle Strength   The patient has normal left knee strength.    Tenderness   The patient is experiencing tenderness in the pes anserinus and medial joint line.    Range of Motion   The patient has normal left knee ROM.    Tests   Josseline:  Medial - negative Lateral - negative  Varus: negative Valgus: negative  Lachman:  Anterior - negative      Patellar apprehension: negative    Other   Erythema: absent  Sensation: normal  Pulse: present  Swelling: none  Effusion: no effusion present            Image Results:  XR knee left 4+ views  Interpreted By:  Everardo Pollard,   STUDY:  XR KNEE LEFT 4+ VIEWS;  1/19/2024 1:00 pm      INDICATION:  Signs/Symptoms:left medial knee pain for several  months, to  follow  up with ortho/ sports medicine.      COMPARISON:  none      ACCESSION NUMBER(S):  [id number]      ORDERING CLINICIAN:  JOSEY HERNANDES      FINDINGS:  Four views left knee      Minimal narrowing of the medial compartment. Trace effusion. No  fracture or dislocation. No osseous lesion.      IMPRESSION  Minimal narrowing of the medial left knee joint space may represent  early changes of osteoarthrosis. Trace nonspecific effusion.          MACRO  none      Signed by: Everardo Pollard 1/19/2024 3:47 PM  Dictation workstation:   [id number]    X-rays of the left knee were reviewed and interpreted by me on 1/26/2024 and revealed mild DJD especially in the medial and patellofemoral compartments without any evidence of acute injuries or fractures.    Assessment/Plan   Encounter Diagnoses:  Localized osteoarthritis of left knee    Left medial knee pain    Pes anserine bursitis    Orders Placed This Encounter    Referral to Physical Therapy     No follow-ups on file.    We discussed her treatment options and agreed for her to begin physical therapy both for possible Pes anserine bursitis as well as left knee DJD.  She will begin prescription dose Voltaren 3 times daily for the next 3 to 4 weeks along with Tylenol up to 1000 mg 3 times daily.  She will then follow-up with me in about 6 weeks for a follow-up visit to determine her response to this plan.  At that time we could potentially perform a diagnostic and hopefully therapeutic cortisone injection of the left knee if she is not having any improvement.  In the future she may respond well to gel injections.  I do not believe that an MRI is warranted at this time as she has no history of trauma, is 68 years old, has a stable exam, and a potential meniscectomy would leave her with worse DJD in the medial compartment.    ** Please excuse any errors in grammar or translation related to this dictation. Voice recognition software was utilized to prepare this document.  **       Ronald Elizabeth MD  [facility]

## 2024-01-30 ENCOUNTER — APPOINTMENT (OUTPATIENT)
Dept: PRIMARY CARE | Facility: CLINIC | Age: 69
End: 2024-01-30
Payer: MEDICARE

## 2024-02-12 DIAGNOSIS — E03.9 ACQUIRED HYPOTHYROIDISM: Primary | ICD-10-CM

## 2024-02-12 RX ORDER — LEVOTHYROXINE SODIUM 25 UG/1
25 TABLET ORAL DAILY
Qty: 90 TABLET | Refills: 2 | Status: SHIPPED | OUTPATIENT
Start: 2024-02-12

## 2024-02-15 ENCOUNTER — EVALUATION (OUTPATIENT)
Dept: PHYSICAL THERAPY | Facility: CLINIC | Age: 69
End: 2024-02-15
Payer: MEDICARE

## 2024-02-15 DIAGNOSIS — M70.50 PES ANSERINE BURSITIS: ICD-10-CM

## 2024-02-15 DIAGNOSIS — M17.12 LOCALIZED OSTEOARTHRITIS OF LEFT KNEE: ICD-10-CM

## 2024-02-15 PROCEDURE — 97161 PT EVAL LOW COMPLEX 20 MIN: CPT | Mod: GP | Performed by: PHYSICAL THERAPIST

## 2024-02-15 PROCEDURE — 97535 SELF CARE MNGMENT TRAINING: CPT | Mod: GP | Performed by: PHYSICAL THERAPIST

## 2024-02-15 ASSESSMENT — ENCOUNTER SYMPTOMS
DEPRESSION: 0
LOSS OF SENSATION IN FEET: 0
OCCASIONAL FEELINGS OF UNSTEADINESS: 0

## 2024-02-15 NOTE — Clinical Note
February 15, 2024    Ashvin Poon, PT  [address] [address]    Patient: Kimberli Lu   YOB: 1955   Date of Visit: 2/15/2024       Dear Ronald Elizabeth MD  [id number]  Department [place] [address]    The attached plan of care is being sent to you because your patient’s medical reimbursement requires that you certify the plan of care. Your signature is required to allow uninterrupted insurance coverage.      You may indicate your approval by signing below and faxing this form back to us at Dept Fax: [phone].    Please call Dept: [phone] with any questions or concerns.    Thank you for this referral,        Ashvin Poon, PT  [facility] [address] [facility]  [address] [facility] [address]    Payer: Payor: MEDICARE / Plan: MEDICARE PART A AND B / Product Type: *No Product type* /                                                                         Date:     Dear Ashvin Poon, PT,     Re: Ms. Kimberli Lu, MRN:[id number]    I certify that I have reviewed the attached plan of care and it is medically necessary for Ms. Kimberli Lu (1955) who is under my care.          ______________________________________                    _________________  Provider name and credentials                                           Date and time                                                                                           Plan of Care 2/15/24   Effective from: 2/15/2024  Effective to: 4/15/2024    Plan ID: [id number]            Participants as of Finalize on 2/15/2024    Name Type Comments Contact Info    Ronald Elizabeth MD Referring Provider  [phone]    sAhvin Poon, PT Physical Therapist  [phone]       Last Plan Note     Author: Ashvin Poon PT Status: Signed Last edited: 2/15/2024  1:30 PM       Physical Therapy Evaluation    Patient Name: Kimberli MCGEE  Aly  MRN: [id number]  Today's Date: 2/15/2024  Referred by: Dr. Elizabeth  Time Calculation  Start Time: 1330  Stop Time: 1420  Time Calculation (min): 50 min  Diagnosis:  1. Pes anserine bursitis  Referral to Physical Therapy    Follow Up In Physical Therapy      2. Localized osteoarthritis of left knee  Referral to Physical Therapy    Follow Up In Physical Therapy      PRECAUTIONS:   Low fall risk    SUBJECTIVE:  Patient reports 6 month history of medial left knee pain, very active with skiing and activities like jogging, denies specific injury, recent x-rays show mild left knee OA, continues to be active but has modified activity a little, presents with diagnosis of pes anserine bursitis.  Pain:  1-7/10 left pes anserine  Home Living:  No issues  Prior level of function:  Full activity without left knee pain    OBJECTIVE:  Knee AROM: (degrees) Left Right   Flexion 135    Extension 0      Knee PROM: (degrees) Left Right   Flexion 135    Extension 0      Knee Strength: MMT Left Right   Flexion 4+/5* /5   Extension 5/5* /5   *denotes pain with motion or muscle testing    Swelling/Girth:  No swelling noted  Gait:  Normal  Palpation:  + tenderness left knee pes anserine, slight medial joint line tenderness  Patellar mobility:  good  Flexibility:   Hamstrings: tight     ASSESSMENT:  Patient with signs consistent with left sided pes anserine bursitis, unclear how much medial knee OA is contributing to symptoms, recommend frequent use of Voltaren gel and ice massage to area, adjust activity level to decrease load to area, start with therapy exercises to work on flexibility and hip strength with a goal of abolishing pain.    TREATMENT:  Initial evaluation performed followed by discussion of findings and instruction in HEP.    PATIENT EDUCATION:  Access Code: [email or web address]  URL: [email or web address]  Date: 02/15/2024  Prepared by: Ashvin Poon    Exercises  - Supine Hamstring Stretch with Strap  - 1  x daily - 7 x weekly - 1 sets - 10 reps - 30 hold  - Supine Hip Adduction Isometric with Ball  - 1 x daily - 7 x weekly - 1 sets - 10 reps - 10 hold  - Long Sitting Isometric Hip Adduction and Extension with Ball at Ankles  - 1 x daily - 7 x weekly - 1 sets - 10 reps - 10 hold  - Figure 4 Bridge  - 1 x daily - 7 x weekly - 3 sets - 10 reps  - Clamshell  - 1 x daily - 7 x weekly - 3 sets - 10 reps  - Sidelying Hip Abduction  - 1 x daily - 7 x weekly - 3 sets - 10 reps    PLAN:   HEP daily, ice massage and Voltaren gel, adjust activity level, follow up in 3 weeks.    Rehab potential:  Good  Plan of care agreement  Patient    GOALS:  Active       PT Problem       Decrease c/o medial left knee pain to 3/10 at worst       Start:  02/15/24    Expected End:  04/15/24            Independent with HEP and symptom management       Start:  02/15/24    Expected End:  04/15/24            Patient Stated Goal: Return to active lifestyle including skiing and jogging without limitation       Start:  02/15/24    Expected End:  04/15/24                     Current Participants as of 2/15/2024    Name Type Comments Contact Info    Ronald Elizabeth MD Referring Provider  [phone]    Signature pending    Ashvin Nagy PT Physical Therapist  [phone]    Signature pending

## 2024-02-15 NOTE — PROGRESS NOTES
Physical Therapy Evaluation    Patient Name: Kimberli Lu  MRN: [id number]  Today's Date: 2/15/2024  Referred by: Dr. Elizabeth  Time Calculation  Start Time: 1330  Stop Time: 1420  Time Calculation (min): 50 min  Diagnosis:  1. Pes anserine bursitis  Referral to Physical Therapy    Follow Up In Physical Therapy      2. Localized osteoarthritis of left knee  Referral to Physical Therapy    Follow Up In Physical Therapy      PRECAUTIONS:   Low fall risk    SUBJECTIVE:  Patient reports 6 month history of medial left knee pain, very active with skiing and activities like jogging, denies specific injury, recent x-rays show mild left knee OA, continues to be active but has modified activity a little, presents with diagnosis of pes anserine bursitis.  Pain:  1-7/10 left pes anserine  Home Living:  No issues  Prior level of function:  Full activity without left knee pain    OBJECTIVE:  Knee AROM: (degrees) Left Right   Flexion 135    Extension 0      Knee PROM: (degrees) Left Right   Flexion 135    Extension 0      Knee Strength: MMT Left Right   Flexion 4+/5* /5   Extension 5/5* /5   *denotes pain with motion or muscle testing    Swelling/Girth:  No swelling noted  Gait:  Normal  Palpation:  + tenderness left knee pes anserine, slight medial joint line tenderness  Patellar mobility:  good  Flexibility:   Hamstrings: tight     ASSESSMENT:  Patient with signs consistent with left sided pes anserine bursitis, unclear how much medial knee OA is contributing to symptoms, recommend frequent use of Voltaren gel and ice massage to area, adjust activity level to decrease load to area, start with therapy exercises to work on flexibility and hip strength with a goal of abolishing pain.    TREATMENT:  Initial evaluation performed followed by discussion of findings and instruction in HEP.    PATIENT EDUCATION:  Access Code: [email or web address]  URL: [email or web address]  Date: 02/15/2024  Prepared by: Ashvin  Cleve    Exercises  - Supine Hamstring Stretch with Strap  - 1 x daily - 7 x weekly - 1 sets - 10 reps - 30 hold  - Supine Hip Adduction Isometric with Ball  - 1 x daily - 7 x weekly - 1 sets - 10 reps - 10 hold  - Long Sitting Isometric Hip Adduction and Extension with Ball at Ankles  - 1 x daily - 7 x weekly - 1 sets - 10 reps - 10 hold  - Figure 4 Bridge  - 1 x daily - 7 x weekly - 3 sets - 10 reps  - Clamshell  - 1 x daily - 7 x weekly - 3 sets - 10 reps  - Sidelying Hip Abduction  - 1 x daily - 7 x weekly - 3 sets - 10 reps    PLAN:   HEP daily, ice massage and Voltaren gel, adjust activity level, follow up in 3 weeks.    Rehab potential:  Good  Plan of care agreement  Patient    GOALS:  Active       PT Problem       Decrease c/o medial left knee pain to 3/10 at worst       Start:  02/15/24    Expected End:  04/15/24            Independent with HEP and symptom management       Start:  02/15/24    Expected End:  04/15/24            Patient Stated Goal: Return to active lifestyle including skiing and jogging without limitation       Start:  02/15/24    Expected End:  04/15/24

## 2024-03-07 ENCOUNTER — TREATMENT (OUTPATIENT)
Dept: PHYSICAL THERAPY | Facility: CLINIC | Age: 69
End: 2024-03-07
Payer: MEDICARE

## 2024-03-07 DIAGNOSIS — M70.50 PES ANSERINE BURSITIS: ICD-10-CM

## 2024-03-07 DIAGNOSIS — M17.12 LOCALIZED OSTEOARTHRITIS OF LEFT KNEE: ICD-10-CM

## 2024-03-07 PROCEDURE — 97110 THERAPEUTIC EXERCISES: CPT | Mod: GP | Performed by: PHYSICAL THERAPIST

## 2024-03-07 NOTE — PROGRESS NOTES
Physical Therapy Treatment    Patient Name: Kimberli Lu  MRN: [id number]  Today's Date: 3/7/2024  Visit: 2/30  Diagnosis:   1. Pes anserine bursitis  Follow Up In Physical Therapy      2. Localized osteoarthritis of left knee  Follow Up In Physical Therapy      PRECAUTIONS:  Low fall risk    SUBJECTIVE:  Patient reports 30-40% improvement in symptoms, finds Voltaren gel helpful, exercises going well.  PAIN:   0-4/10 medial left knee    OBJECTIVE:  - tenderness left knee pes anserine, + medial joint line    TREATMENT:  - Therex:   Bike x 6 min L2  HS stretch 5x30 sec  LAQ 2x15 4#  SAQ 2x15 4#  SLR 2x15 2#  SLR abd 2x15 2#  SL bridge 2x15  Clam shell 2x15 YTB    ASSESSMENT:  Pes anserine signs have resolved, continued signs or irritated medial joint, some mild OA noted on x-ray, discussed nataliya training with bike and walking, new strength exercises every other day.    EDUCATION:  Access Code: [email or web address]  URL: [email or web address]  Date: 03/07/2024  Prepared by: Ashvin Poon    Exercises  - Standing Hamstring Stretch on Chair  - 1 x daily - 7 x weekly - 1 sets - 5 reps - 30 hold  - Figure 4 Bridge  - 1 x daily - 7 x weekly - 3 sets - 10 reps  - Straight Leg Raise with Ankle Weight  - 1 x daily - 7 x weekly - 3 sets - 10 reps  - Sidelying Hip Abduction with Resistance at Thighs  - 1 x daily - 7 x weekly - 3 sets - 10 reps  - Clamshell with Resistance  - 1 x daily - 7 x weekly - 3 sets - 10 reps  - Seated Long Arc Quad with Ankle Weight  - 1 x daily - 7 x weekly - 3 sets - 10 reps    PLAN:   Hep every other day, follow up in 3 weeks.

## 2024-03-08 ENCOUNTER — APPOINTMENT (OUTPATIENT)
Dept: ORTHOPEDIC SURGERY | Facility: CLINIC | Age: 69
End: 2024-03-08
Payer: MEDICARE

## 2024-03-14 ENCOUNTER — OFFICE VISIT (OUTPATIENT)
Dept: PRIMARY CARE | Facility: CLINIC | Age: 69
End: 2024-03-14
Payer: MEDICARE

## 2024-03-14 VITALS
SYSTOLIC BLOOD PRESSURE: 96 MMHG | DIASTOLIC BLOOD PRESSURE: 63 MMHG | RESPIRATION RATE: 16 BRPM | WEIGHT: 115 LBS | BODY MASS INDEX: 22.58 KG/M2 | TEMPERATURE: 96.8 F | HEIGHT: 60 IN | OXYGEN SATURATION: 99 % | HEART RATE: 66 BPM

## 2024-03-14 DIAGNOSIS — M25.562 LEFT MEDIAL KNEE PAIN: ICD-10-CM

## 2024-03-14 DIAGNOSIS — Z00.00 ROUTINE GENERAL MEDICAL EXAMINATION AT HEALTH CARE FACILITY: Primary | ICD-10-CM

## 2024-03-14 DIAGNOSIS — G47.30 SLEEP-DISORDERED BREATHING: ICD-10-CM

## 2024-03-14 DIAGNOSIS — K21.9 GASTROESOPHAGEAL REFLUX DISEASE WITHOUT ESOPHAGITIS: ICD-10-CM

## 2024-03-14 DIAGNOSIS — E78.2 MIXED HYPERLIPIDEMIA: ICD-10-CM

## 2024-03-14 DIAGNOSIS — E03.9 ACQUIRED HYPOTHYROIDISM: ICD-10-CM

## 2024-03-14 PROCEDURE — 1170F FXNL STATUS ASSESSED: CPT | Performed by: INTERNAL MEDICINE

## 2024-03-14 PROCEDURE — G0439 PPPS, SUBSEQ VISIT: HCPCS | Performed by: INTERNAL MEDICINE

## 2024-03-14 PROCEDURE — 1160F RVW MEDS BY RX/DR IN RCRD: CPT | Performed by: INTERNAL MEDICINE

## 2024-03-14 PROCEDURE — 99214 OFFICE O/P EST MOD 30 MIN: CPT | Performed by: INTERNAL MEDICINE

## 2024-03-14 PROCEDURE — 1123F ACP DISCUSS/DSCN MKR DOCD: CPT | Performed by: INTERNAL MEDICINE

## 2024-03-14 PROCEDURE — 1159F MED LIST DOCD IN RCRD: CPT | Performed by: INTERNAL MEDICINE

## 2024-03-14 PROCEDURE — 1036F TOBACCO NON-USER: CPT | Performed by: INTERNAL MEDICINE

## 2024-03-14 RX ORDER — FAMOTIDINE 40 MG/1
40 TABLET, FILM COATED ORAL DAILY PRN
Qty: 90 TABLET | Refills: 1 | Status: SHIPPED | OUTPATIENT
Start: 2024-03-14 | End: 2025-03-14

## 2024-03-14 SDOH — ECONOMIC STABILITY: FOOD INSECURITY: WITHIN THE PAST 12 MONTHS, YOU WORRIED THAT YOUR FOOD WOULD RUN OUT BEFORE YOU GOT MONEY TO BUY MORE.: NEVER TRUE

## 2024-03-14 SDOH — ECONOMIC STABILITY: FOOD INSECURITY: WITHIN THE PAST 12 MONTHS, THE FOOD YOU BOUGHT JUST DIDN'T LAST AND YOU DIDN'T HAVE MONEY TO GET MORE.: NEVER TRUE

## 2024-03-14 ASSESSMENT — LIFESTYLE VARIABLES
HOW OFTEN DO YOU HAVE A DRINK CONTAINING ALCOHOL: MONTHLY OR LESS
HOW OFTEN DO YOU HAVE SIX OR MORE DRINKS ON ONE OCCASION: NEVER
AUDIT-C TOTAL SCORE: 1
SKIP TO QUESTIONS 9-10: 1
HOW MANY STANDARD DRINKS CONTAINING ALCOHOL DO YOU HAVE ON A TYPICAL DAY: 1 OR 2

## 2024-03-14 ASSESSMENT — PATIENT HEALTH QUESTIONNAIRE - PHQ9
2. FEELING DOWN, DEPRESSED OR HOPELESS: NOT AT ALL
1. LITTLE INTEREST OR PLEASURE IN DOING THINGS: NOT AT ALL
SUM OF ALL RESPONSES TO PHQ9 QUESTIONS 1 & 2: 0

## 2024-03-14 ASSESSMENT — ACTIVITIES OF DAILY LIVING (ADL)
TAKING_MEDICATION: INDEPENDENT
DRESSING: INDEPENDENT
BATHING: INDEPENDENT
MANAGING_FINANCES: INDEPENDENT
DOING_HOUSEWORK: INDEPENDENT
GROCERY_SHOPPING: INDEPENDENT

## 2024-03-14 ASSESSMENT — ENCOUNTER SYMPTOMS
OCCASIONAL FEELINGS OF UNSTEADINESS: 0
LOSS OF SENSATION IN FEET: 0
DEPRESSION: 0

## 2024-03-14 NOTE — ASSESSMENT & PLAN NOTE
Mammogram will be scheduled by the patient, patient is due for follow up colonoscopy in 2027 (5 year follow up)

## 2024-03-14 NOTE — ASSESSMENT & PLAN NOTE
The pain may be  due to pes anserine bursitis, still has some pain, decreased since PT was completed. Follow up with Dr Perrin, possible knee injection

## 2024-03-14 NOTE — PROGRESS NOTES
"Subjective   Reason for Visit: Kimberli Lu is an 68 y.o. female here for a Medicare Wellness visit.     Past Medical, Surgical, and Family History reviewed and updated in chart.    Reviewed all medications by prescribing practitioner or clinical pharmacist (such as prescriptions, OTCs, herbal therapies and supplements) and documented in the medical record.    HPI       Follow up and Medicare wellness exam:    Osteoporosis: patient takes Prolia, takes calcium and vitamin D. She gets Prolia injection at [facility] now. Patient had DEXA scan completed at [facility] on 10/30/2023       Sleep issues: The patient snores, and she is concerned that she may have sleep apnea. Patient did not have a sleep study completed. \"I'm doing OK with that\" , she does not want to have a sleep study now     left knee pain: noted some increased pain on the medical aspect of her left knee for several months, worse after exercise, no injury, no skin changes/redness/swelling. Patient states that ache is constant ache over the medial aspect of the knee. Patient has  had an x ray of the knee completed. She was seen by sports medicine at [facility], she was ordered PT by Dr Elizabeth, she is to follow up after PT, it has improved pain somewhat.      hypothyroidism: patient takes low dose levothyroxine 25 mcg.     acid reflux: patient previously took omeprazole, but now is only using it as needed. She usually only uses this medication when she eats acidic foods. She does not have any symptom if she stays away from those foods. Patient still gets heartburn at times. She wonders about treatment for this      hyperlipidemia: taking Zetia and rosuvastatin now. Labs were completed. Patient has no issues with meds at present time, she only takes 5 mg rosuvastatin daily     B 12 deficiency: most recent B12 level was elevated, labs are ordered to be completed in 6/2024    Patient Care Team:  Buddy Godoy MD as PCP - General (Internal " "Medicine)  Buddy Godoy MD as PCP - [facility]     Review of Systems     otherwise negative aside from what was mentioned above in HPI.     Objective   Vitals:  BP 96/63 (BP Location: Right arm, Patient Position: Sitting, BP Cuff Size: Adult)   Pulse 66   Temp 36 °C (96.8 °F) (Temporal)   Resp 16   Ht 1.511 m (4' 11.5\")   Wt 52.2 kg (115 lb)   SpO2 99%   BMI 22.84 kg/m²       Physical Exam  CONSTITUTIONAL - well nourished, well developed, looks like stated age, in no acute distress, not ill-appearing, and not tired appearing  SKIN - normal skin color and pigmentation, normal skin turgor without rash, lesions, or nodules visualized on exposed skin  HEAD - no trauma, normocephalic  EYES - extraocular muscles are intact, and normal external exam  NECK - supple without rigidity, no neck mass was observed, no thyromegaly or thyroid nodules  CHEST - clear to auscultation, no wheezing, no crackles and no rales, good effort  CARDIAC - regular rate and regular rhythm, no skipped beats, no murmur, no carotid bruits noted  EXTREMITIES - no edema, no deformities  NEUROLOGICAL - alert, oriented and no focal signs  MSK - left knee without erythema, ecchymosis, or swelling; tender to palpation on medical aspect of joint, inferior to the patella   PSYCHIATRIC - alert, pleasant and cordial, age-appropriate  IMMUNOLOGIC - no cervical lymphadenopathy      Assessment/Plan   Problem List Items Addressed This Visit       Hypothyroidism    Current Assessment & Plan     Check labs as ordered         Hyperlipidemia    Current Assessment & Plan     Very stable, recheck labs prior to next visit, no med change for now         GERD (gastroesophageal reflux disease)    Relevant Medications    famotidine (Pepcid) 40 mg tablet    Sleep-disordered breathing    Current Assessment & Plan     Patient wants to defer treatment and assessment for now         Left medial knee pain    Current Assessment & Plan     The pain " may be  due to pes anserine bursitis, still has some pain, decreased since PT was completed. Follow up with Dr Perrin, possible knee injection         Routine general medical examination at health care facility - Primary    Current Assessment & Plan     Mammogram will be scheduled by the patient, patient is due for follow up colonoscopy in 2027 (5 year follow up)          Recommend follow up as scheduled, medicare wellness exam is completed today, follow up after labs are completed

## 2024-03-28 ENCOUNTER — APPOINTMENT (OUTPATIENT)
Dept: PHYSICAL THERAPY | Facility: CLINIC | Age: 69
End: 2024-03-28
Payer: MEDICARE

## 2024-04-12 ENCOUNTER — TREATMENT (OUTPATIENT)
Dept: PHYSICAL THERAPY | Facility: CLINIC | Age: 69
End: 2024-04-12
Payer: MEDICARE

## 2024-04-12 DIAGNOSIS — M17.12 LOCALIZED OSTEOARTHRITIS OF LEFT KNEE: Primary | ICD-10-CM

## 2024-04-12 DIAGNOSIS — M70.50 PES ANSERINE BURSITIS: ICD-10-CM

## 2024-04-12 PROCEDURE — 97535 SELF CARE MNGMENT TRAINING: CPT | Mod: GP | Performed by: PHYSICAL THERAPIST

## 2024-04-12 PROCEDURE — 97110 THERAPEUTIC EXERCISES: CPT | Mod: GP | Performed by: PHYSICAL THERAPIST

## 2024-04-12 NOTE — PROGRESS NOTES
Physical Therapy Treatment    Patient Name: Kimberli Lu  MRN: [id number]  Today's Date: 4/12/2024  Visit: 3/30  Diagnosis:   1. Localized osteoarthritis of left knee        2. Pes anserine bursitis        PRECAUTIONS:  Low fall risk    SUBJECTIVE:  Patient reports after initial improvement she feels her progress has hit a plateau.   PAIN:   0-6/10 medial left knee    OBJECTIVE:  + over specific area of left knee medial joit line  Full left knee ROM: 0-140  Knee strength 5/5, hip abd/ER 4-/5  Normal gait    TREATMENT:  - Therex:   Bike x 6 min L2  Quick recheck and HEP instruction    ASSESSMENT:  Patient with sings of probable left knee medial meniscus degenerative tear, very limited therapy deficits at this time, independent with HEP, will email Dr. Elizabeth and recommend cortisone injection and MRI if symptoms persist.    GOALS:  Resolved       PT Problem       Decrease c/o medial left knee pain to 3/10 at worst (Not met)       Start:  02/15/24    Expected End:  04/15/24    Resolved:  04/12/24         Independent with HEP and symptom management (Met)       Start:  02/15/24    Expected End:  04/15/24    Resolved:  04/12/24         Patient Stated Goal: Return to active lifestyle including skiing and jogging without limitation (Not met)       Start:  02/15/24    Expected End:  04/15/24    Resolved:  04/12/24           PLAN:   DC from PT, follow up with Sports Medicine, continue with HEP.

## 2024-07-12 ENCOUNTER — LAB (OUTPATIENT)
Dept: LAB | Facility: LAB | Age: 69
End: 2024-07-12
Payer: MEDICARE

## 2024-07-12 DIAGNOSIS — M25.562 LEFT MEDIAL KNEE PAIN: ICD-10-CM

## 2024-07-12 DIAGNOSIS — E03.9 ACQUIRED HYPOTHYROIDISM: ICD-10-CM

## 2024-07-12 DIAGNOSIS — R74.8 ELEVATED VITAMIN B12 LEVEL: ICD-10-CM

## 2024-07-12 DIAGNOSIS — E78.2 MIXED HYPERLIPIDEMIA: ICD-10-CM

## 2024-07-12 DIAGNOSIS — M81.0 AGE-RELATED OSTEOPOROSIS WITHOUT CURRENT PATHOLOGICAL FRACTURE: ICD-10-CM

## 2024-07-12 DIAGNOSIS — K21.9 GASTROESOPHAGEAL REFLUX DISEASE WITHOUT ESOPHAGITIS: ICD-10-CM

## 2024-07-12 DIAGNOSIS — M81.0 OSTEOPOROSIS, UNSPECIFIED OSTEOPOROSIS TYPE, UNSPECIFIED PATHOLOGICAL FRACTURE PRESENCE: ICD-10-CM

## 2024-07-12 LAB
25(OH)D3 SERPL-MCNC: 50 NG/ML (ref 30–100)
ALBUMIN SERPL BCP-MCNC: 4.4 G/DL (ref 3.4–5)
ALP SERPL-CCNC: 45 U/L (ref 33–136)
ALT SERPL W P-5'-P-CCNC: 17 U/L (ref 7–45)
ANION GAP SERPL CALC-SCNC: 9 MMOL/L (ref 10–20)
AST SERPL W P-5'-P-CCNC: 20 U/L (ref 9–39)
BASOPHILS # BLD AUTO: 0.04 X10*3/UL (ref 0–0.1)
BASOPHILS NFR BLD AUTO: 0.9 %
BILIRUB SERPL-MCNC: 0.5 MG/DL (ref 0–1.2)
BUN SERPL-MCNC: 13 MG/DL (ref 6–23)
CA-I BLD-SCNC: 1.2 MMOL/L (ref 1.1–1.33)
CALCIUM SERPL-MCNC: 9.1 MG/DL (ref 8.6–10.3)
CHLORIDE SERPL-SCNC: 104 MMOL/L (ref 98–107)
CHOLEST SERPL-MCNC: 157 MG/DL (ref 0–199)
CHOLESTEROL/HDL RATIO: 2.6
CO2 SERPL-SCNC: 30 MMOL/L (ref 21–32)
CREAT SERPL-MCNC: 0.75 MG/DL (ref 0.5–1.05)
CREAT UR-MCNC: 34.8 MG/DL (ref 20–320)
EGFRCR SERPLBLD CKD-EPI 2021: 86 ML/MIN/1.73M*2
EOSINOPHIL # BLD AUTO: 0.07 X10*3/UL (ref 0–0.7)
EOSINOPHIL NFR BLD AUTO: 1.6 %
ERYTHROCYTE [DISTWIDTH] IN BLOOD BY AUTOMATED COUNT: 13.3 % (ref 11.5–14.5)
GLUCOSE SERPL-MCNC: 86 MG/DL (ref 74–99)
HCT VFR BLD AUTO: 43.4 % (ref 36–46)
HDLC SERPL-MCNC: 60.4 MG/DL
HGB BLD-MCNC: 14.4 G/DL (ref 12–16)
IMM GRANULOCYTES # BLD AUTO: 0.01 X10*3/UL (ref 0–0.7)
IMM GRANULOCYTES NFR BLD AUTO: 0.2 % (ref 0–0.9)
LDLC SERPL CALC-MCNC: 80 MG/DL
LYMPHOCYTES # BLD AUTO: 1.44 X10*3/UL (ref 1.2–4.8)
LYMPHOCYTES NFR BLD AUTO: 33.8 %
MCH RBC QN AUTO: 30.4 PG (ref 26–34)
MCHC RBC AUTO-ENTMCNC: 33.2 G/DL (ref 32–36)
MCV RBC AUTO: 92 FL (ref 80–100)
MICROALBUMIN UR-MCNC: <7 MG/L
MICROALBUMIN/CREAT UR: NORMAL MG/G{CREAT}
MONOCYTES # BLD AUTO: 0.3 X10*3/UL (ref 0.1–1)
MONOCYTES NFR BLD AUTO: 7 %
NEUTROPHILS # BLD AUTO: 2.4 X10*3/UL (ref 1.2–7.7)
NEUTROPHILS NFR BLD AUTO: 56.5 %
NON HDL CHOLESTEROL: 97 MG/DL (ref 0–149)
NRBC BLD-RTO: 0 /100 WBCS (ref 0–0)
PLATELET # BLD AUTO: 190 X10*3/UL (ref 150–450)
POTASSIUM SERPL-SCNC: 4.1 MMOL/L (ref 3.5–5.3)
PROT SERPL-MCNC: 6.8 G/DL (ref 6.4–8.2)
RBC # BLD AUTO: 4.74 X10*6/UL (ref 4–5.2)
SODIUM SERPL-SCNC: 139 MMOL/L (ref 136–145)
TRIGL SERPL-MCNC: 84 MG/DL (ref 0–149)
TSH SERPL-ACNC: 1.5 MIU/L (ref 0.44–3.98)
VIT B12 SERPL-MCNC: 578 PG/ML (ref 211–911)
VLDL: 17 MG/DL (ref 0–40)
WBC # BLD AUTO: 4.3 X10*3/UL (ref 4.4–11.3)

## 2024-07-12 PROCEDURE — 80061 LIPID PANEL: CPT

## 2024-07-12 PROCEDURE — 82607 VITAMIN B-12: CPT

## 2024-07-12 PROCEDURE — 82043 UR ALBUMIN QUANTITATIVE: CPT

## 2024-07-12 PROCEDURE — 82306 VITAMIN D 25 HYDROXY: CPT

## 2024-07-12 PROCEDURE — 36415 COLL VENOUS BLD VENIPUNCTURE: CPT

## 2024-07-12 PROCEDURE — 84443 ASSAY THYROID STIM HORMONE: CPT

## 2024-07-12 PROCEDURE — 80053 COMPREHEN METABOLIC PANEL: CPT

## 2024-07-12 PROCEDURE — 82330 ASSAY OF CALCIUM: CPT

## 2024-07-12 PROCEDURE — 85025 COMPLETE CBC W/AUTO DIFF WBC: CPT

## 2024-07-12 PROCEDURE — 82570 ASSAY OF URINE CREATININE: CPT

## 2024-07-15 ENCOUNTER — INFUSION (OUTPATIENT)
Dept: INFUSION THERAPY | Facility: HOSPITAL | Age: 69
End: 2024-07-15
Payer: MEDICARE

## 2024-07-15 VITALS
DIASTOLIC BLOOD PRESSURE: 67 MMHG | HEART RATE: 63 BPM | SYSTOLIC BLOOD PRESSURE: 100 MMHG | TEMPERATURE: 97.1 F | OXYGEN SATURATION: 98 % | RESPIRATION RATE: 18 BRPM

## 2024-07-15 DIAGNOSIS — M81.0 OSTEOPOROSIS, UNSPECIFIED OSTEOPOROSIS TYPE, UNSPECIFIED PATHOLOGICAL FRACTURE PRESENCE: ICD-10-CM

## 2024-07-15 DIAGNOSIS — M81.0 AGE-RELATED OSTEOPOROSIS WITHOUT CURRENT PATHOLOGICAL FRACTURE: ICD-10-CM

## 2024-07-15 PROCEDURE — 96372 THER/PROPH/DIAG INJ SC/IM: CPT | Performed by: INTERNAL MEDICINE

## 2024-07-15 PROCEDURE — 2500000004 HC RX 250 GENERAL PHARMACY W/ HCPCS (ALT 636 FOR OP/ED): Mod: JZ | Performed by: INTERNAL MEDICINE

## 2024-07-15 PROCEDURE — 96372 THER/PROPH/DIAG INJ SC/IM: CPT

## 2024-07-15 RX ORDER — EPINEPHRINE 0.3 MG/.3ML
0.3 INJECTION SUBCUTANEOUS EVERY 5 MIN PRN
OUTPATIENT
Start: 2025-01-08

## 2024-07-15 RX ORDER — DIPHENHYDRAMINE HYDROCHLORIDE 50 MG/ML
50 INJECTION INTRAMUSCULAR; INTRAVENOUS AS NEEDED
OUTPATIENT
Start: 2025-01-08

## 2024-07-15 RX ORDER — ACETAMINOPHEN 325 MG/1
650 TABLET ORAL ONCE
Status: DISCONTINUED | OUTPATIENT
Start: 2024-07-15 | End: 2024-07-15 | Stop reason: HOSPADM

## 2024-07-15 RX ORDER — FAMOTIDINE 10 MG/ML
20 INJECTION INTRAVENOUS ONCE AS NEEDED
OUTPATIENT
Start: 2025-01-08

## 2024-07-15 RX ORDER — ALBUTEROL SULFATE 0.83 MG/ML
3 SOLUTION RESPIRATORY (INHALATION) AS NEEDED
OUTPATIENT
Start: 2025-01-08

## 2024-07-15 RX ORDER — ACETAMINOPHEN 325 MG/1
650 TABLET ORAL ONCE
OUTPATIENT
Start: 2025-01-08

## 2024-07-15 SDOH — ECONOMIC STABILITY: FOOD INSECURITY: WITHIN THE PAST 12 MONTHS, THE FOOD YOU BOUGHT JUST DIDN'T LAST AND YOU DIDN'T HAVE MONEY TO GET MORE.: NEVER TRUE

## 2024-07-15 SDOH — ECONOMIC STABILITY: FOOD INSECURITY: WITHIN THE PAST 12 MONTHS, YOU WORRIED THAT YOUR FOOD WOULD RUN OUT BEFORE YOU GOT MONEY TO BUY MORE.: NEVER TRUE

## 2024-07-15 ASSESSMENT — ENCOUNTER SYMPTOMS
DEPRESSION: 0
OCCASIONAL FEELINGS OF UNSTEADINESS: 0
LOSS OF SENSATION IN FEET: 0

## 2024-07-15 ASSESSMENT — PATIENT HEALTH QUESTIONNAIRE - PHQ9
1. LITTLE INTEREST OR PLEASURE IN DOING THINGS: NOT AT ALL
2. FEELING DOWN, DEPRESSED OR HOPELESS: NOT AT ALL
SUM OF ALL RESPONSES TO PHQ9 QUESTIONS 1 AND 2: 0

## 2024-07-15 ASSESSMENT — COLUMBIA-SUICIDE SEVERITY RATING SCALE - C-SSRS
1. IN THE PAST MONTH, HAVE YOU WISHED YOU WERE DEAD OR WISHED YOU COULD GO TO SLEEP AND NOT WAKE UP?: NO
2. HAVE YOU ACTUALLY HAD ANY THOUGHTS OF KILLING YOURSELF?: NO
6. HAVE YOU EVER DONE ANYTHING, STARTED TO DO ANYTHING, OR PREPARED TO DO ANYTHING TO END YOUR LIFE?: NO

## 2024-07-26 ENCOUNTER — APPOINTMENT (OUTPATIENT)
Dept: PRIMARY CARE | Facility: CLINIC | Age: 69
End: 2024-07-26
Payer: MEDICARE

## 2024-08-01 ENCOUNTER — APPOINTMENT (OUTPATIENT)
Dept: PRIMARY CARE | Facility: CLINIC | Age: 69
End: 2024-08-01
Payer: MEDICARE

## 2024-08-01 VITALS
DIASTOLIC BLOOD PRESSURE: 69 MMHG | SYSTOLIC BLOOD PRESSURE: 107 MMHG | OXYGEN SATURATION: 97 % | RESPIRATION RATE: 16 BRPM | TEMPERATURE: 97.4 F | WEIGHT: 108 LBS | HEIGHT: 60 IN | HEART RATE: 69 BPM | BODY MASS INDEX: 21.2 KG/M2

## 2024-08-01 DIAGNOSIS — M81.0 AGE-RELATED OSTEOPOROSIS WITHOUT CURRENT PATHOLOGICAL FRACTURE: ICD-10-CM

## 2024-08-01 DIAGNOSIS — E03.9 ACQUIRED HYPOTHYROIDISM: ICD-10-CM

## 2024-08-01 DIAGNOSIS — M81.0 OSTEOPOROSIS, UNSPECIFIED OSTEOPOROSIS TYPE, UNSPECIFIED PATHOLOGICAL FRACTURE PRESENCE: Primary | ICD-10-CM

## 2024-08-01 DIAGNOSIS — G47.30 SLEEP-DISORDERED BREATHING: ICD-10-CM

## 2024-08-01 DIAGNOSIS — E78.2 MIXED HYPERLIPIDEMIA: ICD-10-CM

## 2024-08-01 DIAGNOSIS — L60.8 CHANGE IN NAIL APPEARANCE: ICD-10-CM

## 2024-08-01 DIAGNOSIS — R53.83 TIREDNESS: ICD-10-CM

## 2024-08-01 PROBLEM — R13.10 DYSPHAGIA: Status: ACTIVE | Noted: 2017-11-22

## 2024-08-01 PROBLEM — M54.50 MIDLINE LOW BACK PAIN WITHOUT SCIATICA: Status: ACTIVE | Noted: 2020-02-04

## 2024-08-01 PROBLEM — E55.9 VITAMIN D DEFICIENCY: Status: ACTIVE | Noted: 2020-02-04

## 2024-08-01 PROBLEM — Z86.39 HISTORY OF VITAMIN D DEFICIENCY: Status: ACTIVE | Noted: 2021-12-09

## 2024-08-01 PROBLEM — M47.25 OSTEOARTHRITIS OF SPINE WITH RADICULOPATHY, THORACOLUMBAR REGION: Status: ACTIVE | Noted: 2021-12-09

## 2024-08-01 PROCEDURE — 99214 OFFICE O/P EST MOD 30 MIN: CPT | Performed by: INTERNAL MEDICINE

## 2024-08-01 PROCEDURE — 3008F BODY MASS INDEX DOCD: CPT | Performed by: INTERNAL MEDICINE

## 2024-08-01 PROCEDURE — 1036F TOBACCO NON-USER: CPT | Performed by: INTERNAL MEDICINE

## 2024-08-01 PROCEDURE — 1123F ACP DISCUSS/DSCN MKR DOCD: CPT | Performed by: INTERNAL MEDICINE

## 2024-08-01 PROCEDURE — 1159F MED LIST DOCD IN RCRD: CPT | Performed by: INTERNAL MEDICINE

## 2024-08-01 RX ORDER — LEVOTHYROXINE SODIUM 25 UG/1
25 TABLET ORAL DAILY
Qty: 90 TABLET | Refills: 2 | Status: SHIPPED | OUTPATIENT
Start: 2024-08-01

## 2024-08-01 RX ORDER — EZETIMIBE 10 MG/1
10 TABLET ORAL DAILY
Qty: 90 TABLET | Refills: 3 | Status: SHIPPED | OUTPATIENT
Start: 2024-08-01 | End: 2025-08-01

## 2024-08-01 SDOH — ECONOMIC STABILITY: FOOD INSECURITY: WITHIN THE PAST 12 MONTHS, YOU WORRIED THAT YOUR FOOD WOULD RUN OUT BEFORE YOU GOT MONEY TO BUY MORE.: NEVER TRUE

## 2024-08-01 SDOH — ECONOMIC STABILITY: FOOD INSECURITY: WITHIN THE PAST 12 MONTHS, THE FOOD YOU BOUGHT JUST DIDN'T LAST AND YOU DIDN'T HAVE MONEY TO GET MORE.: NEVER TRUE

## 2024-08-01 ASSESSMENT — LIFESTYLE VARIABLES
SKIP TO QUESTIONS 9-10: 1
HOW MANY STANDARD DRINKS CONTAINING ALCOHOL DO YOU HAVE ON A TYPICAL DAY: 1 OR 2
HOW OFTEN DO YOU HAVE A DRINK CONTAINING ALCOHOL: MONTHLY OR LESS
HOW OFTEN DO YOU HAVE SIX OR MORE DRINKS ON ONE OCCASION: NEVER
AUDIT-C TOTAL SCORE: 1

## 2024-08-01 NOTE — ASSESSMENT & PLAN NOTE
Patient complains of tiredness. She has not had a sleep study, will refer to sleep medicine for an assessment if issue

## 2024-08-01 NOTE — PROGRESS NOTES
Chief Complaint/HPI:       Osteoporosis: patient takes Prolia, takes calcium and vitamin D. She gets Prolia injection at [facility] now. Patient had DEXA scan completed at [facility] on 10/30/2023. Patient will be due for DEXA recheck in 10/2025       Sleep issues: The patient snores, and she is concerned that she may have sleep apnea. Patient did not have a sleep study completed. Patient admits to being tired.     left knee pain: noted some increased pain on the medical aspect of her left knee for several months, worse after exercise, no injury, no skin changes/redness/swelling. Patient states that ache is constant ache over the medial aspect of the knee. Patient has  had an x ray of the knee completed. She was seen by sports medicine at [facility], she was ordered PT by Dr Elizabeth, she is to follow up after PT, she is to follow up with him.       hypothyroidism: patient takes low dose levothyroxine 25 mcg.     acid reflux: stomach is OK now.     hyperlipidemia: taking Zetia and rosuvastatin now. Labs were completed. Patient has no issues with meds at present time, she only takes 5 mg rosuvastatin daily, she is concerned about dementia risk.      B 12 deficiency: B12 level is stable    Patient has already received 2 doses of Shingrix, she wonders if she needs additional vaccines.      ROS otherwise negative aside from what was mentioned above in HPI.      Patient Active Problem List   Diagnosis    Traumatic loss of toenail of right great toe    Skin aging    Age-related osteoporosis without current pathological fracture    Hypothyroidism    Hyperlipidemia    GERD (gastroesophageal reflux disease)    Chronic reflux esophagitis    Sleep-disordered breathing    Left medial knee pain    Pes anserine bursitis    Localized osteoarthritis of left knee    Routine general medical examination at health care facility    Dysphagia    Hereditary and idiopathic peripheral neuropathy    History of vitamin D deficiency    Hypotension,  "unspecified    Midline low back pain without sciatica    Osteoarthritis of spine with radiculopathy, thoracolumbar region    Vitamin D deficiency    Change in nail appearance    Tiredness         Past Medical History:   Diagnosis Date    Disease of thyroid gland     GERD (gastroesophageal reflux disease)     Osteoporosis      History reviewed. No pertinent surgical history.  Social History     Social History Narrative    Not on file         ALLERGIES  Pravastatin      MEDICATIONS  Current Outpatient Medications on File Prior to Visit   Medication Sig Dispense Refill    cyanocobalamin, vitamin B-12, 2,500 mcg tablet, sublingual Place under the tongue once daily.      estradiol (Estrace) 0.01 % (0.1 mg/gram) vaginal cream APPLY 1 GRAM VAGINALLY TWICE WEEKLY      famotidine (Pepcid) 40 mg tablet Take 1 tablet (40 mg) by mouth once daily as needed for heartburn. 90 tablet 1    multivit-min-iron-FA-lutein (Multivitamin Women 50 Plus) 8 mg iron-400 mcg-300 mcg tablet Take 1 tablet by mouth once daily.      [DISCONTINUED] levothyroxine (Synthroid, Levoxyl) 25 mcg tablet TAKE 1 TABLET BY MOUTH EVERY DAY 90 tablet 2    denosumab (Prolia) 60 mg/mL syringe Inject 1 mL (60 mg) under the skin 1 time for 1 dose. 1 mL 1    rosuvastatin (Crestor) 5 mg tablet Take 1 tablet (5 mg) by mouth once daily. 90 tablet 3    [DISCONTINUED] ezetimibe (Zetia) 10 mg tablet Take 1 tablet (10 mg) by mouth once daily. 90 tablet 3    [DISCONTINUED] omeprazole (PriLOSEC) 40 mg DR capsule Take 1 capsule (40 mg) by mouth 2 times a day. Only as needed       No current facility-administered medications on file prior to visit.         PHYSICAL EXAM  /69 (BP Location: Right arm, Patient Position: Sitting, BP Cuff Size: Adult)   Pulse 69   Temp 36.3 °C (97.4 °F)   Resp 16   Ht 1.511 m (4' 11.5\")   Wt 49 kg (108 lb)   SpO2 97%   BMI 21.45 kg/m²   Body mass index is 21.45 kg/m².    CONSTITUTIONAL - well nourished, well developed, looks like " stated age, in no acute distress, not ill-appearing, and not tired appearing, she is pleasant  SKIN - normal skin color and pigmentation, normal skin turgor without rash, lesions, or nodules visualized on exposed skin  HEAD - no trauma, normocephalic  EYES - extraocular muscles are intact, and normal external exam  NECK - supple without rigidity, no neck mass was observed, no thyromegaly or thyroid nodules  CHEST - clear to auscultation, no wheezing, no crackles and no rales, good effort  CARDIAC - regular rate and regular rhythm, no skipped beats, no murmur, no carotid bruits noted  EXTREMITIES - no edema, no deformities  NEUROLOGICAL - alert, oriented and no focal signs  MSK - left knee without erythema, ecchymosis, or swelling; tender to palpation on medical aspect of joint, inferior to the patella , this is not new. Patient has slight discoloration of the great toenails, she does not appear to have onychomycosis  PSYCHIATRIC - alert, pleasant and cordial, age-appropriate  IMMUNOLOGIC - no cervical lymphadenopathy       ASSESSMENT/PLAN  Problem List Items Addressed This Visit       Age-related osteoporosis without current pathological fracture    Current Assessment & Plan     Advise continuing Prolia, it is reordered, repeat DEXA scan in 2025         Relevant Orders    CBC and Auto Differential    Comprehensive Metabolic Panel    Change in nail appearance    Relevant Orders    Referral to Podiatry    CBC and Auto Differential    Comprehensive Metabolic Panel    Vitamin D 25-Hydroxy,Total (for eval of Vitamin D levels)    Hyperlipidemia    Current Assessment & Plan     Controlled, would continue low dose rosuvastatin and ezetimibe. Patient is concerned about side effects of statin therapy. Will continue to monitor         Relevant Medications    ezetimibe (Zetia) 10 mg tablet    Other Relevant Orders    CBC and Auto Differential    Comprehensive Metabolic Panel    Lipid Panel    Hypothyroidism    Current  Assessment & Plan     Stable, recheck labs in 6 months         Relevant Medications    levothyroxine (Synthroid, Levoxyl) 25 mcg tablet    Other Relevant Orders    Referral to Podiatry    CBC and Auto Differential    Comprehensive Metabolic Panel    TSH with reflex to Free T4 if abnormal    Sleep-disordered breathing    Current Assessment & Plan     Patient complains of tiredness. She has not had a sleep study, will refer to sleep medicine for an assessment if issue         Relevant Orders    Referral to Adult Sleep Medicine    CBC and Auto Differential    Comprehensive Metabolic Panel    Tiredness    Relevant Orders    Referral to Adult Sleep Medicine    Albumin-Creatinine Ratio, Urine Random    CBC and Auto Differential    Comprehensive Metabolic Panel     Other Visit Diagnoses       Osteoporosis, unspecified osteoporosis type, unspecified pathological fracture presence    -  Primary    Relevant Orders    Referral to Podiatry    CBC and Auto Differential    Comprehensive Metabolic Panel    Vitamin D 25-Hydroxy,Total (for eval of Vitamin D levels)          Recheck routine labs in 6 months, follow up in 6 months, patient should not require addition Zoster vaccines at this time     Buddy Godoy MD

## 2024-08-01 NOTE — ASSESSMENT & PLAN NOTE
Controlled, would continue low dose rosuvastatin and ezetimibe. Patient is concerned about side effects of statin therapy. Will continue to monitor

## 2024-08-04 DIAGNOSIS — E78.2 MIXED HYPERLIPIDEMIA: ICD-10-CM

## 2024-08-05 RX ORDER — ROSUVASTATIN CALCIUM 5 MG/1
5 TABLET, COATED ORAL DAILY
Qty: 90 TABLET | Refills: 3 | Status: SHIPPED | OUTPATIENT
Start: 2024-08-05

## 2024-09-09 ENCOUNTER — TELEPHONE (OUTPATIENT)
Dept: ORTHOPEDIC SURGERY | Facility: HOSPITAL | Age: 69
End: 2024-09-09
Payer: MEDICARE

## 2024-09-17 ENCOUNTER — APPOINTMENT (OUTPATIENT)
Dept: ORTHOPEDIC SURGERY | Facility: CLINIC | Age: 69
End: 2024-09-17
Payer: MEDICARE

## 2024-09-17 VITALS — BODY MASS INDEX: 21.2 KG/M2 | HEIGHT: 60 IN | WEIGHT: 108 LBS

## 2024-09-17 DIAGNOSIS — M17.12 LOCALIZED OSTEOARTHRITIS OF LEFT KNEE: Primary | ICD-10-CM

## 2024-09-17 PROCEDURE — 1159F MED LIST DOCD IN RCRD: CPT | Performed by: EMERGENCY MEDICINE

## 2024-09-17 PROCEDURE — 99213 OFFICE O/P EST LOW 20 MIN: CPT | Performed by: EMERGENCY MEDICINE

## 2024-09-17 PROCEDURE — 3008F BODY MASS INDEX DOCD: CPT | Performed by: EMERGENCY MEDICINE

## 2024-09-17 PROCEDURE — 1123F ACP DISCUSS/DSCN MKR DOCD: CPT | Performed by: EMERGENCY MEDICINE

## 2024-09-17 ASSESSMENT — ENCOUNTER SYMPTOMS: KNEE DEFORMITY: 1

## 2024-09-17 NOTE — PROGRESS NOTES
Subjective    Patient ID: Kimberli Lu is a 69 y.o. female.    Chief Complaint: Pain of the Left Knee (States PT helped, but she isn't 100% better. States she still has a pain level of 6 out of 10. She would like to know if she can get a MRI)     Last Surgery: No surgery found  Last Surgery Date: No surgery found    Kimberli is a very pleasant 68-year-old female who is coming in with acute on chronic left knee discomfort referred here by her PCP Dr. Godoy.  She states that she is pretty active and enjoys skiing with her family.  For the last year she has noticed some gradual onset atraumatic medial left knee discomfort.  She has tried water therapy but is still having some pain.  She takes Tylenol occasionally with minimal relief of her symptoms.  She had x-rays on 1/19/2024 that were ordered by her PCP.  Here today she is reporting that her pain is mostly 3 out of 10 and is located over the medial joint line and actually over the Pes anserine bursa.  She has no clicking or decreased range of motion of the knee.  She is able to bear weight.  She would like to avoid surgery if at all possible.  She is mentioning that her son thinks that she should get an MRI. We agreed for her to begin physical therapy both for possible Pes anserine bursitis as well as left knee DJD.  She will begin prescription dose Voltaren 3 times daily for the next 3 to 4 weeks along with Tylenol up to 1000 mg 3 times daily.  She will then follow-up with me in about 6 weeks for a follow-up visit to determine her response to this plan.  At that time we could potentially perform a diagnostic and hopefully therapeutic cortisone injection of the left knee if she is not having any improvement.  In the future she may respond well to gel injections.  I do not believe that an MRI is warranted at this time as she has no history of trauma, is 68 years old, has a stable exam, and a potential meniscectomy would leave her with worse DJD in the medial  compartment.    Update on 9/17/2024.  Kimberli is coming back in for a follow-up visit for her acute on chronic left knee pain secondary to DJD.  She has been using Voltaren and doing physical therapy but is still having some symptoms over the medial joint compartment of the left knee.  She has a wedding coming up since her nephew was getting  down in [place] and she is wondering if there is anything else we could do and specifically has interested in potentially getting an MRI.  She denies any mechanical symptoms.  Her pain is rated 5 or 6 out of 10.  It does not radiate.  No infectious symptoms.  No other complaints or today.    Left Knee         Objective   Right Knee Exam   Right knee exam is normal.      Left Knee Exam     Muscle Strength   The patient has normal left knee strength.    Tenderness   The patient is experiencing tenderness in the pes anserinus and medial joint line.    Range of Motion   The patient has normal left knee ROM.    Tests   Josseline:  Medial - negative Lateral - negative  Varus: negative Valgus: negative  Lachman:  Anterior - negative      Patellar apprehension: negative    Other   Erythema: absent  Sensation: normal  Pulse: present  Swelling: none  Effusion: no effusion present    Comments:  Examination unchanged            Image Results:  No new imaging today    Assessment/Plan   Encounter Diagnoses:  Localized osteoarthritis of left knee    No orders of the defined types were placed in this encounter.    No follow-ups on file.    We discussed her treatment options and both agreed that this is most likely all secondary to medial compartment degenerative changes.  She may have a mild component of some anserine bursitis.  She is going to continue prescription dose Voltaren and home exercises with physical therapy and is also going to begin taking 1000 mg of Tylenol 3 times a day at prescription dosing for her pain.  I offered her a cortisone injection but she declined and  would rather hold off and try the Tylenol first.  We both agreed that an MRI would not be useful at this point since she would not want a meniscectomy that could potentially speed up her degenerative process and is not currently interested in a knee replacement either.  She is going to follow-up with me as needed moving forward.    ** Please excuse any errors in grammar or translation related to this dictation. Voice recognition software was utilized to prepare this document. **       Ronald Elizabeth MD  [facility]

## 2024-09-20 DIAGNOSIS — K21.9 GASTROESOPHAGEAL REFLUX DISEASE WITHOUT ESOPHAGITIS: ICD-10-CM

## 2024-09-20 RX ORDER — FAMOTIDINE 40 MG/1
40 TABLET, FILM COATED ORAL DAILY PRN
Qty: 90 TABLET | Refills: 1 | Status: SHIPPED | OUTPATIENT
Start: 2024-09-20 | End: 2025-09-20

## 2024-09-20 RX ORDER — RESPIRATORY SYNCYTIAL VISUS VACCINE RECOMBINANT, ADJUVANTED 120MCG/0.5
0.5 KIT INTRAMUSCULAR ONCE
COMMUNITY
Start: 2023-10-14

## 2025-01-23 ENCOUNTER — LAB (OUTPATIENT)
Dept: LAB | Facility: LAB | Age: 70
End: 2025-01-23
Payer: MEDICARE

## 2025-01-23 DIAGNOSIS — R53.83 TIREDNESS: ICD-10-CM

## 2025-01-23 DIAGNOSIS — M81.0 AGE-RELATED OSTEOPOROSIS WITHOUT CURRENT PATHOLOGICAL FRACTURE: ICD-10-CM

## 2025-01-23 DIAGNOSIS — G47.30 SLEEP-DISORDERED BREATHING: ICD-10-CM

## 2025-01-23 DIAGNOSIS — M81.0 OSTEOPOROSIS, UNSPECIFIED OSTEOPOROSIS TYPE, UNSPECIFIED PATHOLOGICAL FRACTURE PRESENCE: ICD-10-CM

## 2025-01-23 DIAGNOSIS — L60.8 CHANGE IN NAIL APPEARANCE: ICD-10-CM

## 2025-01-23 DIAGNOSIS — E03.9 ACQUIRED HYPOTHYROIDISM: ICD-10-CM

## 2025-01-23 DIAGNOSIS — E78.2 MIXED HYPERLIPIDEMIA: ICD-10-CM

## 2025-01-23 LAB
25(OH)D3 SERPL-MCNC: 48 NG/ML (ref 30–100)
ALBUMIN SERPL BCP-MCNC: 4.3 G/DL (ref 3.4–5)
ALP SERPL-CCNC: 46 U/L (ref 33–136)
ALT SERPL W P-5'-P-CCNC: 16 U/L (ref 7–45)
ANION GAP SERPL CALC-SCNC: 11 MMOL/L (ref 10–20)
AST SERPL W P-5'-P-CCNC: 18 U/L (ref 9–39)
BASOPHILS # BLD AUTO: 0.05 X10*3/UL (ref 0–0.1)
BASOPHILS NFR BLD AUTO: 0.8 %
BILIRUB SERPL-MCNC: 0.4 MG/DL (ref 0–1.2)
BUN SERPL-MCNC: 11 MG/DL (ref 6–23)
CA-I BLD-SCNC: 1.22 MMOL/L (ref 1.1–1.33)
CALCIUM SERPL-MCNC: 9 MG/DL (ref 8.6–10.3)
CHLORIDE SERPL-SCNC: 105 MMOL/L (ref 98–107)
CHOLEST SERPL-MCNC: 132 MG/DL (ref 0–199)
CHOLESTEROL/HDL RATIO: 2.3
CO2 SERPL-SCNC: 30 MMOL/L (ref 21–32)
CREAT SERPL-MCNC: 0.74 MG/DL (ref 0.5–1.05)
CREAT UR-MCNC: 108.7 MG/DL (ref 20–320)
EGFRCR SERPLBLD CKD-EPI 2021: 87 ML/MIN/1.73M*2
EOSINOPHIL # BLD AUTO: 0.1 X10*3/UL (ref 0–0.7)
EOSINOPHIL NFR BLD AUTO: 1.6 %
ERYTHROCYTE [DISTWIDTH] IN BLOOD BY AUTOMATED COUNT: 12.9 % (ref 11.5–14.5)
GLUCOSE SERPL-MCNC: 90 MG/DL (ref 74–99)
HCT VFR BLD AUTO: 42.9 % (ref 36–46)
HDLC SERPL-MCNC: 58.4 MG/DL
HGB BLD-MCNC: 14 G/DL (ref 12–16)
IMM GRANULOCYTES # BLD AUTO: 0.01 X10*3/UL (ref 0–0.7)
IMM GRANULOCYTES NFR BLD AUTO: 0.2 % (ref 0–0.9)
LDLC SERPL CALC-MCNC: 57 MG/DL
LYMPHOCYTES # BLD AUTO: 1.92 X10*3/UL (ref 1.2–4.8)
LYMPHOCYTES NFR BLD AUTO: 31.6 %
MCH RBC QN AUTO: 31.4 PG (ref 26–34)
MCHC RBC AUTO-ENTMCNC: 32.6 G/DL (ref 32–36)
MCV RBC AUTO: 96 FL (ref 80–100)
MICROALBUMIN UR-MCNC: 9.4 MG/L
MICROALBUMIN/CREAT UR: 8.6 UG/MG CREAT
MONOCYTES # BLD AUTO: 0.39 X10*3/UL (ref 0.1–1)
MONOCYTES NFR BLD AUTO: 6.4 %
NEUTROPHILS # BLD AUTO: 3.6 X10*3/UL (ref 1.2–7.7)
NEUTROPHILS NFR BLD AUTO: 59.4 %
NON HDL CHOLESTEROL: 74 MG/DL (ref 0–149)
NRBC BLD-RTO: 0 /100 WBCS (ref 0–0)
PLATELET # BLD AUTO: 192 X10*3/UL (ref 150–450)
POTASSIUM SERPL-SCNC: 3.9 MMOL/L (ref 3.5–5.3)
PROT SERPL-MCNC: 6.8 G/DL (ref 6.4–8.2)
RBC # BLD AUTO: 4.46 X10*6/UL (ref 4–5.2)
SODIUM SERPL-SCNC: 142 MMOL/L (ref 136–145)
TRIGL SERPL-MCNC: 85 MG/DL (ref 0–149)
TSH SERPL-ACNC: 2.75 MIU/L (ref 0.44–3.98)
VLDL: 17 MG/DL (ref 0–40)
WBC # BLD AUTO: 6.1 X10*3/UL (ref 4.4–11.3)

## 2025-01-23 PROCEDURE — 80053 COMPREHEN METABOLIC PANEL: CPT

## 2025-01-23 PROCEDURE — 82570 ASSAY OF URINE CREATININE: CPT

## 2025-01-23 PROCEDURE — 82043 UR ALBUMIN QUANTITATIVE: CPT

## 2025-01-23 PROCEDURE — 82330 ASSAY OF CALCIUM: CPT

## 2025-01-23 PROCEDURE — 82306 VITAMIN D 25 HYDROXY: CPT

## 2025-01-23 PROCEDURE — 84443 ASSAY THYROID STIM HORMONE: CPT

## 2025-01-23 PROCEDURE — 80061 LIPID PANEL: CPT

## 2025-01-23 PROCEDURE — 85025 COMPLETE CBC W/AUTO DIFF WBC: CPT

## 2025-02-05 ENCOUNTER — APPOINTMENT (OUTPATIENT)
Dept: PRIMARY CARE | Facility: CLINIC | Age: 70
End: 2025-02-05
Payer: MEDICARE

## 2025-02-05 VITALS
DIASTOLIC BLOOD PRESSURE: 68 MMHG | SYSTOLIC BLOOD PRESSURE: 102 MMHG | OXYGEN SATURATION: 97 % | WEIGHT: 110.2 LBS | BODY MASS INDEX: 21.64 KG/M2 | RESPIRATION RATE: 16 BRPM | HEART RATE: 56 BPM | HEIGHT: 60 IN | TEMPERATURE: 97.6 F

## 2025-02-05 DIAGNOSIS — M81.0 AGE-RELATED OSTEOPOROSIS WITHOUT CURRENT PATHOLOGICAL FRACTURE: ICD-10-CM

## 2025-02-05 DIAGNOSIS — E03.9 ACQUIRED HYPOTHYROIDISM: ICD-10-CM

## 2025-02-05 DIAGNOSIS — G47.30 SLEEP-DISORDERED BREATHING: ICD-10-CM

## 2025-02-05 DIAGNOSIS — M25.562 LEFT MEDIAL KNEE PAIN: ICD-10-CM

## 2025-02-05 DIAGNOSIS — E55.9 VITAMIN D DEFICIENCY: ICD-10-CM

## 2025-02-05 DIAGNOSIS — E78.2 MIXED HYPERLIPIDEMIA: Primary | ICD-10-CM

## 2025-02-05 PROCEDURE — 99214 OFFICE O/P EST MOD 30 MIN: CPT | Performed by: INTERNAL MEDICINE

## 2025-02-05 PROCEDURE — 3008F BODY MASS INDEX DOCD: CPT | Performed by: INTERNAL MEDICINE

## 2025-02-05 PROCEDURE — 1159F MED LIST DOCD IN RCRD: CPT | Performed by: INTERNAL MEDICINE

## 2025-02-05 PROCEDURE — 1036F TOBACCO NON-USER: CPT | Performed by: INTERNAL MEDICINE

## 2025-02-05 PROCEDURE — 1123F ACP DISCUSS/DSCN MKR DOCD: CPT | Performed by: INTERNAL MEDICINE

## 2025-02-05 RX ORDER — LEVOTHYROXINE SODIUM 25 UG/1
25 TABLET ORAL DAILY
Qty: 90 TABLET | Refills: 2 | Status: SHIPPED | OUTPATIENT
Start: 2025-02-05

## 2025-02-05 SDOH — ECONOMIC STABILITY: FOOD INSECURITY: WITHIN THE PAST 12 MONTHS, THE FOOD YOU BOUGHT JUST DIDN'T LAST AND YOU DIDN'T HAVE MONEY TO GET MORE.: NEVER TRUE

## 2025-02-05 SDOH — ECONOMIC STABILITY: FOOD INSECURITY: WITHIN THE PAST 12 MONTHS, YOU WORRIED THAT YOUR FOOD WOULD RUN OUT BEFORE YOU GOT MONEY TO BUY MORE.: NEVER TRUE

## 2025-02-05 ASSESSMENT — LIFESTYLE VARIABLES
HOW OFTEN DO YOU HAVE A DRINK CONTAINING ALCOHOL: MONTHLY OR LESS
AUDIT-C TOTAL SCORE: 1
SKIP TO QUESTIONS 9-10: 1
HOW MANY STANDARD DRINKS CONTAINING ALCOHOL DO YOU HAVE ON A TYPICAL DAY: 1 OR 2
HOW OFTEN DO YOU HAVE SIX OR MORE DRINKS ON ONE OCCASION: NEVER

## 2025-02-05 ASSESSMENT — PATIENT HEALTH QUESTIONNAIRE - PHQ9
2. FEELING DOWN, DEPRESSED OR HOPELESS: NOT AT ALL
SUM OF ALL RESPONSES TO PHQ9 QUESTIONS 1 & 2: 0
1. LITTLE INTEREST OR PLEASURE IN DOING THINGS: NOT AT ALL

## 2025-02-05 NOTE — ASSESSMENT & PLAN NOTE
Continue the Prolia, check DEXA scan in 11/2025, this will be completed at [facility], no med changes for now

## 2025-02-05 NOTE — ASSESSMENT & PLAN NOTE
She still gets some recurrent pain, continue exercises that were used at PT, use Voltaren gel as needed also , recommend follow up with sports medicine also, possible injection therapy

## 2025-02-05 NOTE — PROGRESS NOTES
"Chief Complaint/HPI:        Bloodshot eye: patient developed some redness of the right eye, some clear drainage. Patient used Sustain eye drops with relief of symptoms. The right eye still \"feels a little different\"    Osteoporosis: patient takes Prolia, takes calcium and vitamin D. She gets Prolia injection at [facility]. Patient had DEXA scan completed at [facility] on 10/30/2023. Next shot will be due next week, every 6 months. Patient will be due for DEXA recheck in 10/2025.      Sleep issues: The patient snores, and she is concerned that she may have sleep apnea. Patient did not have a sleep study completed. Patient admits to being tired and some days are better than others. Patient is wondering if she might have low magnesium levels and if that's contributing to her fatigue.      left knee pain: noted some increased pain on the medical aspect of her left knee for several months, worse after exercise, no injury, no skin changes/redness/swelling. Patient states that ache is constant ache over the medial aspect of the knee that is worse with jogging. Patient has had an x ray of the knee completed. She was seen by sports medicine at [facility], she was ordered PT by Dr Elizabeth, she followed up after PT. She has not fallen. She has not had injection therapy. Topical Voltaren is helpful      hypothyroidism: patient takes low dose levothyroxine 25 mcg.     acid reflux: stomach is OK now.     hyperlipidemia: taking Zetia and rosuvastatin now. Labs were completed. Patient has no issues with meds at present time, she only takes 5 mg rosuvastatin daily, she is concerned about dementia risk. She is wondering if she could go down on her cholesterol medicines.     B 12 deficiency: B12 level is stable.    Patient has already received 2 doses of Shingrix, she wonders if she needs additional vaccines.     Right eye redness: This started 2 days ago associated with dryness. She woke up and noticed it, there was some clear discharge. " Denies itching or vision changes.    ROS otherwise negative aside from what was mentioned above in HPI.      Patient Active Problem List   Diagnosis    Traumatic loss of toenail of right great toe    Skin aging    Age-related osteoporosis without current pathological fracture    Hypothyroidism    Hyperlipidemia    GERD (gastroesophageal reflux disease)    Chronic reflux esophagitis    Sleep-disordered breathing    Left medial knee pain    Pes anserine bursitis    Localized osteoarthritis of left knee    Routine general medical examination at health care facility    Dysphagia    Hereditary and idiopathic peripheral neuropathy    History of vitamin D deficiency    Hypotension, unspecified    Midline low back pain without sciatica    Osteoarthritis of spine with radiculopathy, thoracolumbar region    Vitamin D deficiency    Change in nail appearance    Tiredness         Past Medical History:   Diagnosis Date    Disease of thyroid gland     GERD (gastroesophageal reflux disease)     Osteoporosis      History reviewed. No pertinent surgical history.  Social History     Social History Narrative    Not on file         ALLERGIES  Pravastatin      MEDICATIONS  Current Outpatient Medications on File Prior to Visit   Medication Sig Dispense Refill    cyanocobalamin, vitamin B-12, 2,500 mcg tablet, sublingual Place under the tongue once daily.      estradiol (Estrace) 0.01 % (0.1 mg/gram) vaginal cream APPLY 1 GRAM VAGINALLY TWICE WEEKLY      ezetimibe (Zetia) 10 mg tablet Take 1 tablet (10 mg) by mouth once daily. 90 tablet 3    famotidine (Pepcid) 40 mg tablet TAKE 1 TABLET (40 MG) BY MOUTH ONCE DAILY AS NEEDED FOR HEARTBURN. 90 tablet 1    multivit-min-iron-FA-lutein (Multivitamin Women 50 Plus) 8 mg iron-400 mcg-300 mcg tablet Take 1 tablet by mouth once daily.      rosuvastatin (Crestor) 5 mg tablet TAKE 1 TABLET BY MOUTH EVERY DAY 90 tablet 3    [DISCONTINUED] levothyroxine (Synthroid, Levoxyl) 25 mcg tablet Take 1 tablet  "(25 mcg) by mouth once daily. 90 tablet 2    denosumab (Prolia) 60 mg/mL syringe Inject 1 mL (60 mg) under the skin 1 time for 1 dose. 1 mL 1    [DISCONTINUED] Arexvy, PF, 120 mcg/0.5 mL suspension for reconstitution Inject 0.5 mL into the muscle 1 time. (Patient not taking: Reported on 2/5/2025)       No current facility-administered medications on file prior to visit.         PHYSICAL EXAM  /68 (BP Location: Left arm, Patient Position: Sitting, BP Cuff Size: Adult)   Pulse 56   Temp 36.4 °C (97.6 °F) (Temporal)   Resp 16   Ht 1.511 m (4' 11.5\")   Wt 50 kg (110 lb 3.2 oz)   SpO2 97%   BMI 21.89 kg/m²   Body mass index is 21.89 kg/m².    CONSTITUTIONAL - well nourished, well developed, looks like stated age, in no acute distress, not ill-appearing, and not tired appearing, she is pleasant  SKIN - normal skin color and pigmentation, normal skin turgor without rash, lesions, or nodules visualized on exposed skin  HEAD - no trauma, normocephalic  EYES - extraocular muscles are intact, and normal external exam, very slight erythema of the right sclera  NECK - supple without rigidity, no neck mass was observed, no thyromegaly or thyroid nodules  CHEST - clear to auscultation, no wheezing, no crackles and no rales, good effort  CARDIAC - regular rate and regular rhythm, no skipped beats, no murmur, no carotid bruits noted  EXTREMITIES - no edema, no deformities  NEUROLOGICAL - alert, oriented and no focal signs  MSK - left knee without erythema, ecchymosis, or swelling; tender to palpation on medical aspect of joint, inferior to the patella , this is not new. Patient has slight discoloration of the great toenails, she does not appear to have onychomycosis  PSYCHIATRIC - alert, pleasant and cordial, age-appropriate  IMMUNOLOGIC - no cervical lymphadenopathy       ASSESSMENT/PLAN  Problem List Items Addressed This Visit       Age-related osteoporosis without current pathological fracture    Current Assessment & " Plan     Continue the Prolia, check DEXA scan in 11/2025, this will be completed at [facility], no med changes for now         Relevant Orders    XR DEXA bone density (Completed)    Albumin-Creatinine Ratio, Urine Random    CBC and Auto Differential    Comprehensive Metabolic Panel    Magnesium    Hyperlipidemia - Primary    Current Assessment & Plan     Continue current dose of rosuvastatin and Zetia, no med change at present time          Relevant Orders    Albumin-Creatinine Ratio, Urine Random    CBC and Auto Differential    Comprehensive Metabolic Panel    Lipid Panel    Hypothyroidism    Relevant Medications    levothyroxine (Synthroid, Levoxyl) 25 mcg tablet    Other Relevant Orders    Albumin-Creatinine Ratio, Urine Random    CBC and Auto Differential    Comprehensive Metabolic Panel    TSH with reflex to Free T4 if abnormal    Left medial knee pain    Current Assessment & Plan     She still gets some recurrent pain, continue exercises that were used at PT, use Voltaren gel as needed also , recommend follow up with sports medicine also, possible injection therapy         Relevant Orders    Albumin-Creatinine Ratio, Urine Random    CBC and Auto Differential    Comprehensive Metabolic Panel    Sleep-disordered breathing    Current Assessment & Plan     Patient is concerned about magnesium, will check magnesium level         Relevant Orders    Albumin-Creatinine Ratio, Urine Random    CBC and Auto Differential    Comprehensive Metabolic Panel    Magnesium    Vitamin D deficiency    Current Assessment & Plan     Continue to monitor         Relevant Orders    Albumin-Creatinine Ratio, Urine Random    CBC and Auto Differential    Comprehensive Metabolic Panel    Vitamin D 25-Hydroxy,Total (for eval of Vitamin D levels)       Recheck routine labs in 6 months, follow up in 6 months. Reassured the patient that she appears stable today

## 2025-02-11 ENCOUNTER — INFUSION (OUTPATIENT)
Dept: INFUSION THERAPY | Facility: HOSPITAL | Age: 70
End: 2025-02-11
Payer: MEDICARE

## 2025-02-11 VITALS
RESPIRATION RATE: 18 BRPM | TEMPERATURE: 98.3 F | OXYGEN SATURATION: 98 % | SYSTOLIC BLOOD PRESSURE: 106 MMHG | DIASTOLIC BLOOD PRESSURE: 68 MMHG | HEART RATE: 73 BPM

## 2025-02-11 DIAGNOSIS — M81.0 OSTEOPOROSIS, UNSPECIFIED OSTEOPOROSIS TYPE, UNSPECIFIED PATHOLOGICAL FRACTURE PRESENCE: ICD-10-CM

## 2025-02-11 DIAGNOSIS — M81.0 AGE-RELATED OSTEOPOROSIS WITHOUT CURRENT PATHOLOGICAL FRACTURE: ICD-10-CM

## 2025-02-11 PROCEDURE — 96372 THER/PROPH/DIAG INJ SC/IM: CPT | Performed by: INTERNAL MEDICINE

## 2025-02-11 PROCEDURE — 2500000004 HC RX 250 GENERAL PHARMACY W/ HCPCS (ALT 636 FOR OP/ED): Mod: JZ | Performed by: INTERNAL MEDICINE

## 2025-02-11 RX ORDER — DIPHENHYDRAMINE HYDROCHLORIDE 50 MG/ML
50 INJECTION INTRAMUSCULAR; INTRAVENOUS AS NEEDED
OUTPATIENT
Start: 2025-07-10

## 2025-02-11 RX ORDER — EPINEPHRINE 0.3 MG/.3ML
0.3 INJECTION SUBCUTANEOUS EVERY 5 MIN PRN
OUTPATIENT
Start: 2025-07-10

## 2025-02-11 RX ORDER — ACETAMINOPHEN 325 MG/1
650 TABLET ORAL ONCE
OUTPATIENT
Start: 2025-07-10

## 2025-02-11 RX ORDER — ALBUTEROL SULFATE 0.83 MG/ML
3 SOLUTION RESPIRATORY (INHALATION) AS NEEDED
OUTPATIENT
Start: 2025-07-10

## 2025-02-11 RX ORDER — ACETAMINOPHEN 325 MG/1
650 TABLET ORAL ONCE
Status: DISCONTINUED | OUTPATIENT
Start: 2025-02-11 | End: 2025-02-11 | Stop reason: HOSPADM

## 2025-02-11 RX ORDER — FAMOTIDINE 10 MG/ML
20 INJECTION INTRAVENOUS ONCE AS NEEDED
OUTPATIENT
Start: 2025-07-10

## 2025-02-11 RX ADMIN — DENOSUMAB 60 MG: 60 INJECTION SUBCUTANEOUS at 13:12

## 2025-02-11 ASSESSMENT — PAIN SCALES - GENERAL: PAINLEVEL_OUTOF10: 0-NO PAIN

## 2025-04-23 DIAGNOSIS — M81.0 OSTEOPOROSIS, UNSPECIFIED OSTEOPOROSIS TYPE, UNSPECIFIED PATHOLOGICAL FRACTURE PRESENCE: ICD-10-CM

## 2025-04-23 RX ORDER — DENOSUMAB 60 MG/ML
60 INJECTION SUBCUTANEOUS ONCE
Qty: 1 ML | Refills: 1 | Status: SHIPPED | OUTPATIENT
Start: 2025-04-23 | End: 2025-04-23

## 2025-04-24 ENCOUNTER — TELEPHONE (OUTPATIENT)
Dept: PRIMARY CARE | Facility: CLINIC | Age: 70
End: 2025-04-24
Payer: MEDICARE

## 2025-04-24 NOTE — TELEPHONE ENCOUNTER
Patient stated she called yesterday about a Prolia injection. I do not see a phone encounter on this but I do see where [facility] pharmacy got a prescription for it.  She wanted an order put in for the infusion center so she could go there and have the Prolia injection done at the hospital.  Call her at [phone] if there are any questions.

## 2025-04-30 DIAGNOSIS — M81.0 OSTEOPOROSIS, UNSPECIFIED OSTEOPOROSIS TYPE, UNSPECIFIED PATHOLOGICAL FRACTURE PRESENCE: Primary | ICD-10-CM

## 2025-04-30 RX ORDER — DENOSUMAB 60 MG/ML
60 INJECTION SUBCUTANEOUS ONCE
Qty: 1 ML | Refills: 1 | Status: SHIPPED | OUTPATIENT
Start: 2025-04-30 | End: 2025-04-30

## 2025-06-24 LAB
25(OH)D3+25(OH)D2 SERPL-MCNC: 59 NG/ML (ref 30–100)
ALBUMIN SERPL-MCNC: 4.8 G/DL (ref 3.6–5.1)
ALBUMIN/CREAT UR: 11 MG/G CREAT
ALP SERPL-CCNC: 45 U/L (ref 37–153)
ALT SERPL-CCNC: 16 U/L (ref 6–29)
ANION GAP SERPL CALCULATED.4IONS-SCNC: 9 MMOL/L (CALC) (ref 7–17)
AST SERPL-CCNC: 20 U/L (ref 10–35)
BASOPHILS # BLD AUTO: 29 CELLS/UL (ref 0–200)
BASOPHILS NFR BLD AUTO: 0.6 %
BILIRUB SERPL-MCNC: 0.5 MG/DL (ref 0.2–1.2)
BUN SERPL-MCNC: 11 MG/DL (ref 7–25)
CALCIUM SERPL-MCNC: 9.2 MG/DL (ref 8.6–10.4)
CHLORIDE SERPL-SCNC: 103 MMOL/L (ref 98–110)
CHOLEST SERPL-MCNC: 126 MG/DL
CHOLEST/HDLC SERPL: 1.9 (CALC)
CO2 SERPL-SCNC: 28 MMOL/L (ref 20–32)
CREAT SERPL-MCNC: 0.67 MG/DL (ref 0.6–1)
CREAT UR-MCNC: 35 MG/DL (ref 20–275)
EGFRCR SERPLBLD CKD-EPI 2021: 93 ML/MIN/1.73M2
EOSINOPHIL # BLD AUTO: 39 CELLS/UL (ref 15–500)
EOSINOPHIL NFR BLD AUTO: 0.8 %
ERYTHROCYTE [DISTWIDTH] IN BLOOD BY AUTOMATED COUNT: 13 % (ref 11–15)
GLUCOSE SERPL-MCNC: 90 MG/DL (ref 65–99)
HCT VFR BLD AUTO: 46 % (ref 35–45)
HDLC SERPL-MCNC: 67 MG/DL
HGB BLD-MCNC: 14.5 G/DL (ref 11.7–15.5)
LDLC SERPL CALC-MCNC: 45 MG/DL (CALC)
LYMPHOCYTES # BLD AUTO: 1289 CELLS/UL (ref 850–3900)
LYMPHOCYTES NFR BLD AUTO: 26.3 %
MCH RBC QN AUTO: 30.5 PG (ref 27–33)
MCHC RBC AUTO-ENTMCNC: 31.5 G/DL (ref 32–36)
MCV RBC AUTO: 96.8 FL (ref 80–100)
MICROALBUMIN UR-MCNC: 0.4 MG/DL
MONOCYTES # BLD AUTO: 289 CELLS/UL (ref 200–950)
MONOCYTES NFR BLD AUTO: 5.9 %
NEUTROPHILS # BLD AUTO: 3254 CELLS/UL (ref 1500–7800)
NEUTROPHILS NFR BLD AUTO: 66.4 %
NONHDLC SERPL-MCNC: 59 MG/DL (CALC)
PLATELET # BLD AUTO: 179 THOUSAND/UL (ref 140–400)
PMV BLD REES-ECKER: 10.8 FL (ref 7.5–12.5)
POTASSIUM SERPL-SCNC: 4 MMOL/L (ref 3.5–5.3)
PROT SERPL-MCNC: 7.4 G/DL (ref 6.1–8.1)
RBC # BLD AUTO: 4.75 MILLION/UL (ref 3.8–5.1)
SODIUM SERPL-SCNC: 140 MMOL/L (ref 135–146)
TRIGL SERPL-MCNC: 61 MG/DL
TSH SERPL-ACNC: 1.18 MIU/L (ref 0.4–4.5)
WBC # BLD AUTO: 4.9 THOUSAND/UL (ref 3.8–10.8)

## 2025-07-05 DIAGNOSIS — E03.9 ACQUIRED HYPOTHYROIDISM: ICD-10-CM

## 2025-07-05 DIAGNOSIS — M81.0 AGE-RELATED OSTEOPOROSIS WITHOUT CURRENT PATHOLOGICAL FRACTURE: ICD-10-CM

## 2025-07-05 DIAGNOSIS — G47.30 SLEEP-DISORDERED BREATHING: ICD-10-CM

## 2025-07-05 DIAGNOSIS — M25.562 LEFT MEDIAL KNEE PAIN: ICD-10-CM

## 2025-07-05 DIAGNOSIS — E78.2 MIXED HYPERLIPIDEMIA: ICD-10-CM

## 2025-07-05 DIAGNOSIS — E55.9 VITAMIN D DEFICIENCY: ICD-10-CM

## 2025-08-04 DIAGNOSIS — E78.2 MIXED HYPERLIPIDEMIA: ICD-10-CM

## 2025-08-04 RX ORDER — ROSUVASTATIN CALCIUM 5 MG/1
5 TABLET, COATED ORAL DAILY
Qty: 90 TABLET | Refills: 3 | Status: SHIPPED | OUTPATIENT
Start: 2025-08-04

## 2025-08-05 DIAGNOSIS — E78.2 MIXED HYPERLIPIDEMIA: ICD-10-CM

## 2025-08-05 RX ORDER — EZETIMIBE 10 MG/1
10 TABLET ORAL DAILY
Qty: 90 TABLET | Refills: 3 | Status: SHIPPED | OUTPATIENT
Start: 2025-08-05

## 2025-08-13 ENCOUNTER — APPOINTMENT (OUTPATIENT)
Dept: INFUSION THERAPY | Facility: HOSPITAL | Age: 70
End: 2025-08-13
Payer: MEDICARE

## 2025-08-22 ENCOUNTER — APPOINTMENT (OUTPATIENT)
Dept: PRIMARY CARE | Facility: CLINIC | Age: 70
End: 2025-08-22
Payer: MEDICARE

## 2025-10-23 ENCOUNTER — APPOINTMENT (OUTPATIENT)
Dept: PRIMARY CARE | Facility: CLINIC | Age: 70
End: 2025-10-23
Payer: MEDICARE